# Patient Record
Sex: MALE | Race: WHITE | NOT HISPANIC OR LATINO | Employment: OTHER | URBAN - METROPOLITAN AREA
[De-identification: names, ages, dates, MRNs, and addresses within clinical notes are randomized per-mention and may not be internally consistent; named-entity substitution may affect disease eponyms.]

---

## 2023-08-24 ENCOUNTER — HOSPITAL ENCOUNTER (OUTPATIENT)
Dept: DATA CONVERSION | Facility: HOSPITAL | Age: 88
End: 2023-08-24
Attending: INTERNAL MEDICINE
Payer: MEDICARE

## 2023-08-24 DIAGNOSIS — R13.19 OTHER DYSPHAGIA: ICD-10-CM

## 2023-08-24 DIAGNOSIS — K29.80 DUODENITIS WITHOUT BLEEDING: ICD-10-CM

## 2023-08-24 DIAGNOSIS — K44.9 DIAPHRAGMATIC HERNIA WITHOUT OBSTRUCTION OR GANGRENE: ICD-10-CM

## 2023-08-24 DIAGNOSIS — K25.9 GASTRIC ULCER, UNSPECIFIED AS ACUTE OR CHRONIC, WITHOUT HEMORRHAGE OR PERFORATION: ICD-10-CM

## 2023-08-24 DIAGNOSIS — R13.10 DYSPHAGIA, UNSPECIFIED: ICD-10-CM

## 2023-08-24 DIAGNOSIS — K22.2 ESOPHAGEAL OBSTRUCTION: ICD-10-CM

## 2023-08-30 LAB
COMPLETE PATHOLOGY REPORT: NORMAL
CONVERTED CLINICAL DIAGNOSIS-HISTORY: NORMAL
CONVERTED FINAL DIAGNOSIS: NORMAL
CONVERTED FINAL REPORT PDF LINK TO COPY AND PASTE: NORMAL
CONVERTED GROSS DESCRIPTION: NORMAL

## 2023-09-08 PROBLEM — M12.812 ROTATOR CUFF ARTHROPATHY OF LEFT SHOULDER: Status: ACTIVE | Noted: 2023-09-08

## 2023-09-08 PROBLEM — I65.23 ASYMPTOMATIC BILATERAL CAROTID ARTERY STENOSIS: Status: ACTIVE | Noted: 2023-09-08

## 2023-09-08 PROBLEM — K21.9 HIATAL HERNIA WITH GERD: Status: ACTIVE | Noted: 2023-09-08

## 2023-09-08 PROBLEM — I35.0 AORTIC STENOSIS: Status: ACTIVE | Noted: 2023-09-08

## 2023-09-08 PROBLEM — I10 HYPERTENSION, ESSENTIAL: Status: ACTIVE | Noted: 2023-09-08

## 2023-09-08 PROBLEM — R00.1 BRADYCARDIA: Status: ACTIVE | Noted: 2023-09-08

## 2023-09-08 PROBLEM — R13.19 ESOPHAGEAL DYSPHAGIA: Status: ACTIVE | Noted: 2023-09-08

## 2023-09-08 PROBLEM — K22.2 ESOPHAGEAL STRICTURE: Status: ACTIVE | Noted: 2023-09-08

## 2023-09-08 PROBLEM — I73.9 PERIPHERAL VASCULAR DISEASE (CMS-HCC): Status: ACTIVE | Noted: 2023-09-08

## 2023-09-08 PROBLEM — L84 CALLUS OF FOOT: Status: ACTIVE | Noted: 2023-09-08

## 2023-09-08 PROBLEM — K44.9 HIATAL HERNIA WITH GERD: Status: ACTIVE | Noted: 2023-09-08

## 2023-09-08 PROBLEM — R01.1 CARDIAC MURMUR: Status: ACTIVE | Noted: 2023-09-08

## 2023-09-08 PROBLEM — M72.2 PLANTAR FASCIITIS: Status: ACTIVE | Noted: 2023-09-08

## 2023-09-08 RX ORDER — ENALAPRIL MALEATE 5 MG/1
5 TABLET ORAL DAILY
COMMUNITY
Start: 2023-07-13

## 2023-09-08 RX ORDER — TAMSULOSIN HYDROCHLORIDE 0.4 MG/1
0.4 CAPSULE ORAL NIGHTLY
COMMUNITY

## 2023-09-08 RX ORDER — OMEPRAZOLE 20 MG/1
20 CAPSULE, DELAYED RELEASE ORAL DAILY
COMMUNITY
Start: 2023-07-22

## 2023-09-08 RX ORDER — MULTIVITAMIN
1 TABLET ORAL DAILY
COMMUNITY

## 2023-09-08 RX ORDER — GABAPENTIN 300 MG/1
300 CAPSULE ORAL NIGHTLY
COMMUNITY
Start: 2016-06-02 | End: 2023-10-12 | Stop reason: ALTCHOICE

## 2023-09-08 RX ORDER — HYDROXYCHLOROQUINE SULFATE 200 MG/1
TABLET, FILM COATED ORAL
COMMUNITY

## 2023-09-08 RX ORDER — ASPIRIN 81 MG/1
1 TABLET ORAL DAILY
COMMUNITY
Start: 2018-02-01

## 2023-09-08 RX ORDER — ESOMEPRAZOLE MAGNESIUM 40 MG/1
40 CAPSULE, DELAYED RELEASE ORAL DAILY
COMMUNITY
End: 2023-10-12 | Stop reason: ALTCHOICE

## 2023-09-08 RX ORDER — PREDNISONE 5 MG/1
5 TABLET ORAL EVERY MORNING
COMMUNITY

## 2023-09-08 RX ORDER — ATORVASTATIN CALCIUM 10 MG/1
0.5 TABLET, FILM COATED ORAL DAILY
COMMUNITY
Start: 2018-02-01 | End: 2023-10-12 | Stop reason: SINTOL

## 2023-09-08 RX ORDER — IBUPROFEN 600 MG/1
600 TABLET ORAL EVERY 8 HOURS PRN
COMMUNITY
End: 2023-10-12 | Stop reason: ALTCHOICE

## 2023-09-08 RX ORDER — METHOTREXATE 2.5 MG/1
4 TABLET ORAL
COMMUNITY

## 2023-09-08 RX ORDER — KETOCONAZOLE 20 MG/ML
SHAMPOO, SUSPENSION TOPICAL
COMMUNITY
Start: 2023-07-27

## 2023-09-08 RX ORDER — ROSUVASTATIN CALCIUM 5 MG/1
5 TABLET, COATED ORAL 2 TIMES WEEKLY
COMMUNITY
Start: 2023-07-20

## 2023-09-29 VITALS — WEIGHT: 165.34 LBS | HEIGHT: 69 IN | BODY MASS INDEX: 24.49 KG/M2

## 2023-10-12 ENCOUNTER — OFFICE VISIT (OUTPATIENT)
Dept: CARDIOLOGY | Facility: HOSPITAL | Age: 88
End: 2023-10-12
Payer: MEDICARE

## 2023-10-12 ENCOUNTER — HOSPITAL ENCOUNTER (OUTPATIENT)
Dept: RADIOLOGY | Facility: HOSPITAL | Age: 88
Discharge: HOME | End: 2023-10-12
Payer: MEDICARE

## 2023-10-12 ENCOUNTER — HOSPITAL ENCOUNTER (OUTPATIENT)
Dept: VASCULAR MEDICINE | Facility: HOSPITAL | Age: 88
Discharge: HOME | End: 2023-10-12
Payer: MEDICARE

## 2023-10-12 VITALS
BODY MASS INDEX: 24.68 KG/M2 | DIASTOLIC BLOOD PRESSURE: 68 MMHG | HEIGHT: 69 IN | SYSTOLIC BLOOD PRESSURE: 170 MMHG | OXYGEN SATURATION: 97 % | HEART RATE: 67 BPM | WEIGHT: 166.6 LBS

## 2023-10-12 DIAGNOSIS — I10 HYPERTENSION, ESSENTIAL: Primary | ICD-10-CM

## 2023-10-12 DIAGNOSIS — I73.9 CLAUDICATION (CMS-HCC): ICD-10-CM

## 2023-10-12 DIAGNOSIS — I35.0 AORTIC VALVE STENOSIS, ETIOLOGY OF CARDIAC VALVE DISEASE UNSPECIFIED: ICD-10-CM

## 2023-10-12 PROCEDURE — 1125F AMNT PAIN NOTED PAIN PRSNT: CPT | Performed by: INTERNAL MEDICINE

## 2023-10-12 PROCEDURE — 3077F SYST BP >= 140 MM HG: CPT | Performed by: INTERNAL MEDICINE

## 2023-10-12 PROCEDURE — 3078F DIAST BP <80 MM HG: CPT | Performed by: INTERNAL MEDICINE

## 2023-10-12 PROCEDURE — 1159F MED LIST DOCD IN RCRD: CPT | Performed by: INTERNAL MEDICINE

## 2023-10-12 PROCEDURE — 1160F RVW MEDS BY RX/DR IN RCRD: CPT | Performed by: INTERNAL MEDICINE

## 2023-10-12 PROCEDURE — 99214 OFFICE O/P EST MOD 30 MIN: CPT | Performed by: INTERNAL MEDICINE

## 2023-10-12 PROCEDURE — 93922 UPR/L XTREMITY ART 2 LEVELS: CPT

## 2023-10-12 PROCEDURE — 1036F TOBACCO NON-USER: CPT | Performed by: INTERNAL MEDICINE

## 2023-10-12 PROCEDURE — 93922 UPR/L XTREMITY ART 2 LEVELS: CPT | Performed by: INTERNAL MEDICINE

## 2023-10-12 RX ORDER — DOCUSATE SODIUM 100 MG/1
100 CAPSULE, LIQUID FILLED ORAL
COMMUNITY

## 2023-10-12 ASSESSMENT — ENCOUNTER SYMPTOMS
OCCASIONAL FEELINGS OF UNSTEADINESS: 0
LOSS OF SENSATION IN FEET: 0
DEPRESSION: 0

## 2023-10-12 NOTE — PATIENT INSTRUCTIONS
Claudication right lower extremity( muscle aches right leg upon waking which resolves upon rest)- TERESITA ( vascular studies today to assess)   Carotid disease-  most recent carotid ultrasound jan 2023- asymptomatic moderate disease left internal carotid and < 50% right internal carotid artery- continue aspirin 81 mg daily and rosuvastatin 5 mg daily2 x per week  Hypertension- BP appears well controlled at home on  enalapril 5 mg daily. No change in meds. Continue current regimen. Continue to follow at home.   Continue current level of exercise/activity.   Mild aortic stenosis on most recent echo  Return to clinic in 3 months

## 2023-10-12 NOTE — PROGRESS NOTES
Date of Visit: 10/12/2023 11:00 AM EDT  Location of visit: [facility]   Type of Visit: New Patient       HPI / Summary:   Addy Souza is a very pleasant 89 y.o. male presenting for follow-up     Patient is an 89 year old man with HTN, mild AS PMR and giant cell arteritis with stable asymptomatic carotid stenosis who returns for follow-up     Prior eval:  2/1/2018 TTE normal LV size and systolic function LVEF 65% with impaired relaxation , AV sclerosis with gradients of 21/8mmHg and no AI. moderate MAC with trivial MR  Carotid ultrasound 2/1/2018: RIght carotid < 50% no significant stenosis in R external, Left carotid 50-69%, stable c/w prior , no significant left external carotid  Followup vascular studies 2/26/2020: R carotid 50-60% prox SANDOVAL, No significant stenosis of R common carotid and right vertebral patent. Left carotid 50-60% prox LICA, > 50% left external carotid no hemodynamically significant stenosis of L common carotid or left subclavian. Left vertebral patent.  Carotid studies 5/6/2022: L ICA with 50-69% stenosis, SANDOVAL < 50% stenosis. Heterogenous plaque bilaterally  Patient is on low dose statin with good response and no myalgias.    Does have PAD, with vascular studies done at [facility] with significant disease with reduced pressures in toe, but only mildly reduced ABIs. Denies any non-healing lesions on toes. Remains on aspirin without any bleeding or bruising issues. Denies cough on enalapril. BP at home running lower since back surgery was running 110-120mmHg systolic on a lower dose of enalapril ( decreased fro 40 to 10 mg daily). In February 2022 pt had repeat carotid studies at [facility] which were stable since prior studies. Feb 7 2022: SANDOVAL 40-59%, Left with 60-79% stenosis, reported to be stable compared with prior from 2016.   preop eval included cardiac stress MRI: Dec 2021: Normal LV size and systolic function with LVEF 68%. no RWMA, no LV thrombus. Mild mid-myocardial enhancement of basal  septal and lateral segments, no infiltration or infarction. Stress perfusion normal, IE no inducible ischemia. severe LAE, moderate RADHA. AV 34/24mmHg mild AI. moderate aortic stenosis.    Echo from Jan 19 2023: normal LV size and fx 60-65% no RWMA, trileaflet AV calcified with gradients of 32.3/15mmHg with DI of 0.44 consistent with mild AS.      In spring of 2022 had back surgery for spinal stenosis and later had hip replacement. Was working hard with PT but still had issues with LE weakness so atorvastatin was stopped. Was to start rosuvastatin 5 mg 2 x per week . Appears to be tolerating his rosuvastatin without myalgias at this point. He is walking about 1 mile daily and does some exercises at home for mobility. He has no CP or SOB with his ADLs. Does find the 1 mile walk difficult. Does note that he gets some right LE muscle aches when walking which resolves when he stops ( possible claudication) . Denies any dizziness presyncope or syncope. BP at home runs 110s-120/60smmHg with HR 60s bpm.        ROS: Full 10 pt review of symptoms of negative unless discussed above.     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    Personal history of malignant melanoma of skin 02/01/2018    History of malignant melanoma of skin    Personal history of other diseases of the musculoskeletal system and connective tissue 04/07/2014    Personal history of arthritis    Personal history of other diseases of the musculoskeletal  "system and connective tissue 02/01/2018    History of giant cell arteritis    Personal history of other diseases of the musculoskeletal system and connective tissue 02/01/2018    History of polymyalgia rheumatica     Surgical Hx:   Past Surgical History:   Procedure Laterality Date    TOTAL HIP ARTHROPLASTY  02/01/2018    Hip Replacement      Social Hx:   Tobacco Use: Medium Risk (10/12/2023)    Patient History     Smoking Tobacco Use: Former     Smokeless Tobacco Use: Never     Passive Exposure: Not on file     Alcohol Use: Not on file     Family Hx:   Family History   Problem Relation Name Age of Onset    No Known Problems Mother      No Known Problems Father        Exam:   Vitals:   Vitals:    10/12/23 1100   BP: 170/68   BP Location: Left arm   Patient Position: Sitting   BP Cuff Size: Adult   Pulse: 67   SpO2: 97%   Weight: 75.6 kg (166 lb 9.6 oz)   Height: 1.753 m (5' 9\")     Wt Readings from Last 5 Encounters:   10/12/23 75.6 kg (166 lb 9.6 oz)   07/20/23 77.1 kg (170 lb 0.4 oz)   01/19/23 74 kg (163 lb 0.6 oz)   09/08/22 74.4 kg (164 lb 0.2 oz)   06/14/22 72.1 kg (159 lb)      Constitutional:       Appearance: Healthy appearance. Not in distress.   Pulmonary:      Effort: Pulmonary effort is normal.      Breath sounds: Normal breath sounds.   Cardiovascular:      PMI at left midclavicular line. Normal rate. Regular rhythm. Normal S1. Normal S2.       Murmurs: There is a grade 2/6 crescendo systolic murmur, radiating to the LRSB.      Comments: Reduced pulses DP and PT RLE.   Edema:     Peripheral edema absent.   Neurological:      Mental Status: Alert and oriented to person, place, and time.       Labs:   Recent Labs     05/03/22  1057 05/12/22  0648 05/13/22  0649 05/13/22 2024 05/14/22  0801 05/15/22  0500 05/19/22  0756   WBC 9.8 12.1* 11.0 10.8 8.8 8.4 7.5   HGB 9.2* 7.5* 7.1* 7.7* 8.0* 8.4* 8.1*   HCT 29.8* 24.5* 22.8* 23.8* 24.3* 27.4* 26.3*    250 213 220 216 280 354   MCV 95 97 95 90 90 94 94 "     Recent Labs     20  0733 10/29/21  0905 22  1057 22  0648 22  0649 22  0801 05/15/22  0500 22  0756    140 137 138 137 136 141 139   K 4.4 4.5 4.4 4.3 4.1 4.0 4.3 4.1    106 103 104 106 105 107 106   BUN 23 34* 34* 21 20 22 23 26*   CREATININE 1.25 1.45* 1.40* 1.23 1.03 1.02 1.05 1.14      Recent Labs     21  0753   HGBA1C 5.5     RESUFAST(CHOL:1,HDL:1,LDLF:1,TRI)  Lab Results   Component Value Date    CHOL 128 2022    HDL 70.1 2022    LDLF 45 2022    TRIG 64 2022     Notable Studies: imaging personally reviewed and summarized by me below  EKG:  EKG from 2023: sinus bradycardia at 55 bpm, otherwise normal EKG.     Echo:   CONCLUSIONS: 2023   1. Left ventricular systolic function is normal with a 60-65% estimated ejection fraction.   2. Spectral Doppler shows an impaired relaxation pattern of left ventricular diastolic filling.   3. There is moderate mitral annular calcification.   4. Slightly elevated RVSP.   5. Moderately calcified and restricted AV with gradients of 32.3/15mmHg and DI of 0.44, consistent with mild AS. There is no AI.   6. There is moderate aortic valve cusp calcification.   7. Compared with the prior exam from 2018 the AV gradients have increased from 21/8mmHg to 32/15mmHg. DI has decreased from 0.57 to 0.44. Overall progression from aortic sclerosis to mild stenosis.     Carotid studies 2022:  CONCLUSIONS:  Right Carotid: Findings are consistent with less than 50% stenosis of the right proximal ICA. Right external carotid artery appears patent with no evidence of stenosis. No evidence of hemodynamically significant stenosis of the right common carotid artery. The right vertebral artery is patent with antegrade flow. Vertebral artery velocities with head turned to right: 27cm/s  Vertebral artery velocities with head turned to left: 65cm/s. No evidence of hemodynamically significant stenosis in  the right subclavian.  Left Carotid: Findings are consistent with 50 to 69% stenosis of the left proximal ICA. There are elevated velocities in the left ECA that are suggestive of disease. No evidence of hemodynamically significant stenosis of the left common carotid artery. The left vertebral artery is patent with antegrade flow. Vertebral artery velocities with head turned to right: 80cm/s  Vertebral artery velocities with head turned to left: 92cm/s. No evidence of hemodynamically significant stenosis in the left subclavian.  Additional Findings:  Category of stenoses unchanged. Unable to obtain velocities of 231cm/s of left  internal carotid artery noted on previous exam 2/26/2020.     Imaging & Doppler Findings:  Right Plaque Morph: The distal right common carotid artery demonstrates heterogenous plaque. The right carotid bulb demonstrates heterogenous plaque.  Left Plaque Morph: The proximal left internal carotid artery demonstrates heterogenous and calcified plaque. The distal left common carotid artery demonstrates heterogenous and calcified plaque. The left carotid bulb demonstrates heterogenous and calcified plaque.     Current Outpatient Medications   Medication Instructions    aspirin 81 mg EC tablet 1 tablet, oral, Daily    docusate sodium (COLACE) 100 mg, oral, Daily with breakfast    enalapril (VASOTEC) 5 mg, oral, Daily, as directed    hydroxychloroquine (Plaquenil) 200 mg tablet TAKE 1 TABLET (200MG) ON MON,WED AND FRI AND 2 TABLETS (400MG) ON OTHER 4 DAYS    ketoconazole (NIZOral) 2 % shampoo PLEASE SEE ATTACHED FOR DETAILED DIRECTIONS    methotrexate (Trexall) 2.5 mg tablet 4 tablets, oral, Weekly    multivitamin tablet 1 tablet, oral, Daily    omeprazole (PRILOSEC) 20 mg, oral, Daily    predniSONE (DELTASONE) 5 mg, oral, Every morning    rosuvastatin (CRESTOR) 5 mg, oral, 2 times weekly    tamsulosin (FLOMAX) 0.4 mg, oral, Nightly     Impressions and Plan:    Pt is a mary anne 89 year old man with  stable asymptomatic moderate carotid disease, hypertension, PMR with giant cell arteritis  and hyperlipidemia whose BP has been well controlled at home and he is able to walk a mild daily without CP or SOB though somewhat difficult given issues with his back and hip. Also had possible claudication so will get TERESITA to exclude significant vascular disease .  He is tolerating his low dose rosuvastatin 2 x per week without myalgias.   Plan  Carotid disease-asx- continue aspirin 81 mg daily and rosuvastatin 5 mg twice weekly  HTN- continue current regimen including enalapril 5 mg daily   Possible claudication- ABIs  Continue current level of exercise. Pt considering to resume working with  to work on strength  Return to clinic in 3 months     Patient Instructions:  If you have any questions or need cardiac medication refills, please call my office at [phone],      To reach my office please call [phone]  To schedule an appointment call [phone].          ____________________________________________________________  Joanne Garay MD  Division of Cardiovascular Medicine  [facility]  [facility]

## 2023-10-13 ENCOUNTER — LAB (OUTPATIENT)
Dept: LAB | Facility: LAB | Age: 88
End: 2023-10-13
Payer: MEDICARE

## 2023-10-13 DIAGNOSIS — I65.23 OCCLUSION AND STENOSIS OF BILATERAL CAROTID ARTERIES: Primary | ICD-10-CM

## 2023-10-13 LAB
CHOLEST SERPL-MCNC: 138 MG/DL (ref 0–199)
CHOLESTEROL/HDL RATIO: 2
HDLC SERPL-MCNC: 68.7 MG/DL
LDLC SERPL CALC-MCNC: 55 MG/DL
NON HDL CHOLESTEROL: 69 MG/DL (ref 0–149)
TRIGL SERPL-MCNC: 74 MG/DL (ref 0–149)
VLDL: 15 MG/DL (ref 0–40)

## 2023-10-13 PROCEDURE — 36415 COLL VENOUS BLD VENIPUNCTURE: CPT

## 2023-10-13 PROCEDURE — 80061 LIPID PANEL: CPT

## 2023-10-20 ENCOUNTER — TELEPHONE (OUTPATIENT)
Dept: CARDIOLOGY | Facility: HOSPITAL | Age: 88
End: 2023-10-20
Payer: MEDICARE

## 2023-10-20 NOTE — TELEPHONE ENCOUNTER
Spoke to pt regarding lipid panel results and TERESITA. No need to change statin dose since currently at LDL target. TERESITA show some disease but no severe stenoses that would require intervention. Will continue current medical manageResult Communication    Resulted Orders   Lipid Panel   Result Value Ref Range    Cholesterol 138 0 - 199 mg/dL      Comment:            Age      Desirable   Borderline High   High     0-19 Y     0 - 169       170 - 199     >/= 200    20-24 Y     0 - 189       190 - 224     >/= 225         >24 Y     0 - 199       200 - 239     >/= 240   **All ranges are based on fasting samples. Specific   therapeutic targets will vary based on patient-specific   cardiac risk.    Pediatric guidelines reference:Pediatrics 2011, 128(S5).Adult guidelines reference: NCEP ATPIII Guidelines,ANTONELLA 2001, 258:2486-97    Venipuncture immediately after or during the administration of Metamizole may lead to falsely low results. Testing should be performed immediately prior to Metamizole dosing.    HDL-Cholesterol 68.7 mg/dL      Comment:        Age       Very Low   Low     Normal    High    0-19 Y    < 35      < 40     40-45     ----  20-24 Y    ----     < 40      >45      ----        >24 Y      ----     < 40     40-60      >60      Cholesterol/HDL Ratio 2.0       Comment:        Ref Values  Desirable  < 3.4  High Risk  > 5.0    LDL Calculated 55 <=99 mg/dL      Comment:                                  Near   Borderline      AGE      Desirable  Optimal    High     High     Very High     0-19 Y     0 - 109     ---    110-129   >/= 130     ----    20-24 Y     0 - 119     ---    120-159   >/= 160     ----      >24 Y     0 -  99   100-129  130-159   160-189     >/=190      VLDL 15 0 - 40 mg/dL    Triglycerides 74 0 - 149 mg/dL      Comment:         Age         Desirable   Borderline High   High     Very High   0 D-90 D    19 - 174         ----         ----        ----  91 D- 9 Y     0 -  74        75 -  99     >/= 100       ----    10-19 Y     0 -  89        90 - 129     >/= 130      ----    20-24 Y     0 - 114       115 - 149     >/= 150      ----         >24 Y     0 - 149       150 - 199    200- 499    >/= 500    Venipuncture immediately after or during the administration of Metamizole may lead to falsely low results. Testing should be performed immediately prior to Metamizole dosing.    Non HDL Cholesterol 69 0 - 149 mg/dL      Comment:            Age       Desirable   Borderline High   High     Very High     0-19 Y     0 - 119       120 - 144     >/= 145    >/= 160    20-24 Y     0 - 149       150 - 189     >/= 190      ----         >24 Y    30 mg/dL above LDL Cholesterol goal         3:35 PM      Results were successfully communicated with the patient and they acknowledged their understanding.  ment

## 2023-10-24 ENCOUNTER — OFFICE VISIT (OUTPATIENT)
Dept: GASTROENTEROLOGY | Facility: CLINIC | Age: 88
End: 2023-10-24
Payer: MEDICARE

## 2023-10-24 VITALS
BODY MASS INDEX: 24.43 KG/M2 | WEIGHT: 165.4 LBS | HEART RATE: 65 BPM | SYSTOLIC BLOOD PRESSURE: 133 MMHG | DIASTOLIC BLOOD PRESSURE: 71 MMHG | TEMPERATURE: 97.3 F

## 2023-10-24 DIAGNOSIS — K22.2 ESOPHAGEAL STRICTURE: ICD-10-CM

## 2023-10-24 DIAGNOSIS — K44.9 HIATAL HERNIA WITH GERD: ICD-10-CM

## 2023-10-24 DIAGNOSIS — K21.9 HIATAL HERNIA WITH GERD: ICD-10-CM

## 2023-10-24 DIAGNOSIS — R13.19 ESOPHAGEAL DYSPHAGIA: Primary | ICD-10-CM

## 2023-10-24 PROCEDURE — 99212 OFFICE O/P EST SF 10 MIN: CPT | Performed by: INTERNAL MEDICINE

## 2023-10-24 PROCEDURE — 1036F TOBACCO NON-USER: CPT | Performed by: INTERNAL MEDICINE

## 2023-10-24 PROCEDURE — 1159F MED LIST DOCD IN RCRD: CPT | Performed by: INTERNAL MEDICINE

## 2023-10-24 PROCEDURE — 99202 OFFICE O/P NEW SF 15 MIN: CPT | Performed by: INTERNAL MEDICINE

## 2023-10-24 PROCEDURE — 1125F AMNT PAIN NOTED PAIN PRSNT: CPT | Performed by: INTERNAL MEDICINE

## 2023-10-24 PROCEDURE — 3078F DIAST BP <80 MM HG: CPT | Performed by: INTERNAL MEDICINE

## 2023-10-24 PROCEDURE — 3075F SYST BP GE 130 - 139MM HG: CPT | Performed by: INTERNAL MEDICINE

## 2023-10-24 PROCEDURE — 1160F RVW MEDS BY RX/DR IN RCRD: CPT | Performed by: INTERNAL MEDICINE

## 2023-10-24 ASSESSMENT — ENCOUNTER SYMPTOMS
GASTROINTESTINAL NEGATIVE: 1
CONSTITUTIONAL NEGATIVE: 1

## 2023-10-24 NOTE — PROGRESS NOTES
Subjective   Patient ID: Addy Souza is a 89 y.o. male who presents for Follow-up (From procedure).  Here after EGD with dilation  Careful on [place] trip  Careful to chew food well  No dysphagia   No problems tolerating Prilosec once a day        Review of Systems   Constitutional: Negative.    Gastrointestinal: Negative.        Objective   Physical Exam  Vitals reviewed.   Constitutional:       Appearance: Normal appearance.   Neurological:      Mental Status: He is alert.         Assessment/Plan   Diagnoses and all orders for this visit:  Esophageal dysphagia resolved  Hiatal hernia with GERD  Esophageal stricture better off on low dose PPI to reduce risk of needing repeat dilation

## 2023-10-24 NOTE — PATIENT INSTRUCTIONS
I'm pleased at your outcome from the endoscopy with dilation  Prilosec 20 mg per day before breakfast will reduce the chance of needing to repeat the procedure in the future

## 2024-01-04 ENCOUNTER — OFFICE VISIT (OUTPATIENT)
Dept: CARDIOLOGY | Facility: HOSPITAL | Age: 89
End: 2024-01-04
Payer: MEDICARE

## 2024-01-04 VITALS
WEIGHT: 166.5 LBS | BODY MASS INDEX: 24.66 KG/M2 | SYSTOLIC BLOOD PRESSURE: 173 MMHG | OXYGEN SATURATION: 100 % | DIASTOLIC BLOOD PRESSURE: 76 MMHG | HEIGHT: 69 IN | HEART RATE: 64 BPM

## 2024-01-04 DIAGNOSIS — R01.1 CARDIAC MURMUR: ICD-10-CM

## 2024-01-04 DIAGNOSIS — I73.9 PERIPHERAL VASCULAR DISEASE (CMS-HCC): ICD-10-CM

## 2024-01-04 DIAGNOSIS — I10 HYPERTENSION, ESSENTIAL: Primary | ICD-10-CM

## 2024-01-04 DIAGNOSIS — I35.0 NONRHEUMATIC AORTIC VALVE STENOSIS: ICD-10-CM

## 2024-01-04 PROCEDURE — 99214 OFFICE O/P EST MOD 30 MIN: CPT | Mod: 25 | Performed by: INTERNAL MEDICINE

## 2024-01-04 PROCEDURE — 93005 ELECTROCARDIOGRAM TRACING: CPT | Performed by: INTERNAL MEDICINE

## 2024-01-04 PROCEDURE — 1159F MED LIST DOCD IN RCRD: CPT | Performed by: INTERNAL MEDICINE

## 2024-01-04 PROCEDURE — 3078F DIAST BP <80 MM HG: CPT | Performed by: INTERNAL MEDICINE

## 2024-01-04 PROCEDURE — 1036F TOBACCO NON-USER: CPT | Performed by: INTERNAL MEDICINE

## 2024-01-04 PROCEDURE — 1160F RVW MEDS BY RX/DR IN RCRD: CPT | Performed by: INTERNAL MEDICINE

## 2024-01-04 PROCEDURE — 1125F AMNT PAIN NOTED PAIN PRSNT: CPT | Performed by: INTERNAL MEDICINE

## 2024-01-04 PROCEDURE — 93010 ELECTROCARDIOGRAM REPORT: CPT | Performed by: INTERNAL MEDICINE

## 2024-01-04 PROCEDURE — 3077F SYST BP >= 140 MM HG: CPT | Performed by: INTERNAL MEDICINE

## 2024-01-04 PROCEDURE — 99214 OFFICE O/P EST MOD 30 MIN: CPT | Performed by: INTERNAL MEDICINE

## 2024-01-04 ASSESSMENT — ENCOUNTER SYMPTOMS
DEPRESSION: 0
OCCASIONAL FEELINGS OF UNSTEADINESS: 0
LOSS OF SENSATION IN FEET: 0

## 2024-01-04 NOTE — PROGRESS NOTES
Primary Care Physician: No primary care provider on file.      Date of Visit: 01/04/2024 10:40 AM EST  Location of visit: [facility]   Type of Visit: Established Patient     HPI / Summary:   Addy Souza is a very pleasant 89 y.o. male  with HTN, mild aortic stenosis, giant cell arteritis with stable asymptomatic moderate carotid stenosis who returns for follow up     Prior eval:  2/1/2018 TTE normal LV size and systolic function LVEF 65% with impaired relaxation , AV sclerosis with gradients of 21/8mmHg and no AI. moderate MAC with trivial MR  Carotid ultrasound 2/1/2018: RIght carotid < 50% no significant stenosis in R external, Left carotid 50-69%, stable c/w prior , no significant left external carotid  Followup vascular studies 2/26/2020: R carotid 50-60% prox SANDOVAL, No significant stenosis of R common carotid and right vertebral patent. Left carotid 50-60% prox LICA, > 50% left external carotid no hemodynamically significant stenosis of L common carotid or left subclavian. Left vertebral patent.  Carotid studies 5/6/2022: L ICA with 50-69% stenosis, SANDOVAL < 50% stenosis. Heterogenous plaque bilaterally     Does have PAD, with vascular studies done at [facility] with significant disease with reduced pressures in toe, but only mildly reduced ABIs. Denies any non-healing lesions on toes. Remains on aspirin without any bleeding or bruising issues. Denies cough on enalapril. In February 2022 pt had repeat carotid studies at [facility] which were stable since prior studies. Feb 7 2022: SANDOVAL 40-59%, Left with 60-79% stenosis, reported to be stable compared with prior from 2016.   preop eval included cardiac stress MRI: Dec 2021: Normal LV size and systolic function with LVEF 68%. no RWMA, no LV thrombus. Mild mid-myocardial enhancement of basal septal and lateral segments, no infiltration or infarction. Stress perfusion normal, IE no inducible ischemia. severe LAE, moderate RADHA. AV 34/24mmHg mild AI. moderate aortic  stenosis.     Echo from Jan 19 2023: normal LV size and fx 60-65% no RWMA, trileaflet AV calcified with gradients of 32.3/15mmHg with DI of 0.44 consistent with mild AS.      In spring of 2022 had back surgery for spinal stenosis and later had hip replacement. Was working hard with PT but still had issues with LE weakness so atorvastatin was stopped. Now on rosuvastatin 5 mg 2 x per week  without myalgias. He is walking about 1 mile daily ( 30 minutes) and does some exercises at home for mobility. He has no CP or SOB with his ADLs.  Does note that he gets some right LE muscle aches when walking which resolves when he stops. Re? Of  possible claudication, recent ABIs did not show significant stenoses. Denies any dizziness presyncope or syncope. BP at home runs 110s/60smmHg with HR 60s bpm. His right foot does get colder faster than left and had reduced toe perfusion on that side. Does not have any nonhealing ulcers.           ROS: Full 10 pt review of symptoms of negative unless discussed above.     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    Personal history of malignant melanoma of skin 02/01/2018    History of malignant melanoma of skin    Personal history of other diseases of the musculoskeletal system and connective tissue 04/07/2014    Personal history of arthritis    Personal history of other diseases of the musculoskeletal system and connective tissue 02/01/2018    History of giant cell arteritis    Personal history of other  "diseases of the musculoskeletal system and connective tissue 02/01/2018    History of polymyalgia rheumatica     Surgical Hx:   Past Surgical History:   Procedure Laterality Date    TOTAL HIP ARTHROPLASTY  02/01/2018    Hip Replacement      Social Hx:   Tobacco Use: Medium Risk (1/4/2024)    Patient History     Smoking Tobacco Use: Former     Smokeless Tobacco Use: Never     Passive Exposure: Not on file     Alcohol Use: Not on file     Family Hx:   Family History   Problem Relation Name Age of Onset    No Known Problems Mother      No Known Problems Father        Exam:   Vitals:   Vitals:    01/04/24 1050   BP: 173/76   BP Location: Left arm   Patient Position: Sitting   BP Cuff Size: Adult   Pulse: 64   SpO2: 100%   Weight: 75.5 kg (166 lb 8 oz)   Height: 1.753 m (5' 9\")     Wt Readings from Last 5 Encounters:   01/04/24 75.5 kg (166 lb 8 oz)   10/24/23 75 kg (165 lb 6.4 oz)   10/12/23 75.6 kg (166 lb 9.6 oz)   07/20/23 77.1 kg (170 lb 0.4 oz)   01/19/23 74 kg (163 lb 0.6 oz)      Constitutional:       Appearance: Healthy appearance. Not in distress.   Pulmonary:      Effort: Pulmonary effort is normal.      Breath sounds: Normal breath sounds.   Cardiovascular:      PMI at left midclavicular line. Normal rate. Regular rhythm. S1 with normal intensity. S2 with normal intensity.       Murmurs: There is a grade 2/6 crescendo-decrescendo systolic murmur.      No gallop.    Edema:     Peripheral edema absent.   Neurological:      Mental Status: Alert and oriented to person, place, and time.       Labs:   Recent Labs     05/19/22  0756 05/15/22  0500 05/14/22  0801 05/13/22 2024 05/13/22  0649 05/12/22  0648 05/03/22  1057   WBC 7.5 8.4 8.8 10.8 11.0 12.1* 9.8   HGB 8.1* 8.4* 8.0* 7.7* 7.1* 7.5* 9.2*   HCT 26.3* 27.4* 24.3* 23.8* 22.8* 24.5* 29.8*    280 216 220 213 250 266   MCV 94 94 90 90 95 97 95     Recent Labs     05/19/22  0756 05/15/22  0500 05/14/22  0801 05/13/22  0649 05/12/22  0648 05/03/22  1057 " 10/29/21  0905 03/12/20  0733    141 136 137 138 137 140 140   K 4.1 4.3 4.0 4.1 4.3 4.4 4.5 4.4    107 105 106 104 103 106 104   BUN 26* 23 22 20 21 34* 34* 23   CREATININE 1.14 1.05 1.02 1.03 1.23 1.40* 1.45* 1.25      Recent Labs     04/23/21  0753   HGBA1C 5.5     Lab Results   Component Value Date    CHOL 138 10/13/2023    HDL 68.7 10/13/2023    LDLF 45 09/12/2022    TRIG 74 10/13/2023   LDL on 10/13/2023: 55.  Notable Studies: imaging personally reviewed and summarized by me below  EKG:  Encounter Date: 01/04/24   ECG 12 lead (Clinic Performed)   Result Value    Ventricular Rate 64    Atrial Rate 64    VA Interval 148    QRS Duration 84    QT Interval 396    QTC Calculation(Bazett) 408    P Axis 68    R Axis 42    T Axis 43    QRS Count 10    Q Onset 222    P Onset 148    P Offset 199    T Offset 420    QTC Fredericia 404    Narrative    Normal sinus rhythm  Normal ECG  When compared with ECG of 20-JUL-2023 11:29,  No significant change was found       Echo:  - Echo (1/19/2023)  PHYSICIAN INTERPRETATION:  Left Ventricle: The left ventricular systolic function is normal, with an estimated ejection fraction of 60-65%. There are no regional wall motion abnormalities. The left ventricular cavity size is normal. Spectral Doppler shows an impaired relaxation pattern of left ventricular diastolic filling.  Left Atrium: The left atrium is mildly dilated. Mobile atrial septum.  Right Ventricle: The right ventricle is normal in size. There is normal right ventricular global systolic function.  Right Atrium: The right atrium is normal in size.  Aortic Valve: The aortic valve is trileaflet. There is moderate aortic valve cusp calcification. There is no evidence of aortic valve regurgitation. The peak instantaneous gradient of the aortic valve is 32.3 mmHg. The mean gradient of the aortic valve is 15.0 mmHg. Moderately calcified and restricted AV with gradients of 32.3/15mmHg and DI of 0.44, consistent with  mild AS. There is no AI.  Mitral Valve: The mitral valve is normal in structure. There is moderate mitral annular calcification. There is trace mitral valve regurgitation.  Tricuspid Valve: The tricuspid valve is structurally normal. There is trace to mild tricuspid regurgitation. The Doppler estimated RVSP is slightly elevated at 32.8 mmHg.  Pulmonic Valve: The pulmonic valve is not well visualized. The pulmonic valve regurgitation was not well visualized.  Pericardium: There is a trivial pericardial effusion.  Aorta: The aortic root is normal. There is mild dilatation of the ascending aorta.  Systemic Veins: The inferior vena cava appears to be of normal size. There is IVC inspiratory collapse greater than 50%.  In comparison to the previous echocardiogram(s): Compared with study from 5/6/2022,. Compared with the prior exam from 2/1/2018 the AV gradients have increased from 21/8mmHg to 32/15mmHg. DI has decreased from 0.57 to 0.44. Overall progression from aortic sclerosis to mild stenosis.     CONCLUSIONS:   1. Left ventricular systolic function is normal with a 60-65% estimated ejection fraction.   2. Spectral Doppler shows an impaired relaxation pattern of left ventricular diastolic filling.   3. There is moderate mitral annular calcification.   4. Slightly elevated RVSP.   5. Moderately calcified and restricted AV with gradients of 32.3/15mmHg and DI of 0.44, consistent with mild AS. There is no AI.   6. There is moderate aortic valve cusp calcification.   7. Compared with the prior exam from 2/1/2018 the AV gradients have increased from 21/8mmHg to 32/15mmHg. DI has decreased from 0.57 to 0.44. Overall progression from aortic sclerosis to mild stenosis.      LE TERESITA 10/12/2023:  CONCLUSIONS:     Right Lower PVR: Evidence of mild arterial occlusive disease in the right lower extremity at rest. Decreased digital perfusion noted. Monophasic flow is noted in the right dorsalis pedis artery. Biphasic flow is noted  in the right posterior tibial artery. Triphasic flow is noted in the right common femoral artery. Severity of disease called by tracings due to partially calcified non-compressible vessels. TERESITA only per Joanne Garay     Left Lower PVR: No evidence of arterial occlusive disease in the left lower extremity at rest. Left pressures of >220 mmHg suggest no compressibility of vessels and may make absolute Segmental Limb Pressures (SLP) unreliable. Normal digital perfusion noted. Biphasic flow is noted in the left dorsalis pedis artery. Triphasic flow is noted in the left common femoral artery and left posterior tibial artery.    Current Outpatient Medications   Medication Instructions    aspirin 81 mg EC tablet 1 tablet, oral, Daily    docusate sodium (COLACE) 100 mg, oral, Daily with breakfast    enalapril (VASOTEC) 5 mg, oral, Daily, as directed    hydroxychloroquine (Plaquenil) 200 mg tablet TAKE 1 TABLET (200MG) ON MON,WED AND FRI AND 2 TABLETS (400MG) ON OTHER 4 DAYS    ketoconazole (NIZOral) 2 % shampoo PLEASE SEE ATTACHED FOR DETAILED DIRECTIONS    methotrexate (Trexall) 2.5 mg tablet 4 tablets, oral, Weekly    multivitamin tablet 1 tablet, oral, Daily    omeprazole (PRILOSEC) 20 mg, oral, Daily    predniSONE (DELTASONE) 5 mg, oral, Every morning    rosuvastatin (CRESTOR) 5 mg, oral, 2 times weekly    tamsulosin (FLOMAX) 0.4 mg, oral, Nightly     Impressions and Plan:    Pt is a mary anne 89 year old man with stable asymptomatic moderate carotid disease, hypertension, PMR with giant cell arteritis  and hyperlipidemia whose BP has been well controlled at home and he is able to walk a mild daily without CP or SOB though somewhat difficult given issues with his back and hip. Also had possible claudication though ABIs  with only mild disease on the right.   Leg pain possibly due to his back issues.  He is tolerating his low dose rosuvastatin 2 x per week without myalgias.   Plan  Carotid disease-asx- continue aspirin  81 mg daily and rosuvastatin 5 mg twice weekly  HTN- continue current regimen including enalapril 5 mg daily . To bring in monitor from home to assess accuracy of reads given good control at home though elevated BP in office.   Reduced toe perfusion on the right. Continue to watch for any sores on feet.   Continue current level of exercise. Pt to continue working with PT 2 x per week.   Return to clinic in 3 months    6. Mild aortic stenosis. Will recheck echo on January 2025 unless new onset symptoms sooner.   Return to clinic in 3 months.    Patient Instructions:  If you have any questions or need cardiac medication refills, please call my office at [phone],      To reach my office please call [phone]  To schedule an appointment call [phone].          ____________________________________________________________  Joanne Garay MD  Division of Cardiovascular Medicine  [facility]  [facility]

## 2024-01-04 NOTE — PATIENT INSTRUCTIONS
Pain in  right lower extremity( muscle aches right leg upon waking which resolves upon rest)- Recent TERESITA significant stenoses other than reduced digital perfusion. May be from back issues  Carotid disease-  most recent carotid ultrasound jan 2023- asymptomatic moderate disease left internal carotid and < 50% right internal carotid artery- continue aspirin 81 mg daily and rosuvastatin 5 mg daily2 x per week  Hypertension- BP appears well controlled at home on  enalapril 5 mg daily, though high I the office today. Please bring in monitor to assess accuracy against office monitor.. No change in meds. Continue current regimen. Continue to follow at home.   Continue current level of exercise/activity.   Mild aortic stenosis on most recent echo- will recheck echo January 2025 unless new onset sx ( as discussed)  Return to clinic in 3 months

## 2024-02-18 LAB
ATRIAL RATE: 64 BPM
P AXIS: 68 DEGREES
P OFFSET: 199 MS
P ONSET: 148 MS
PR INTERVAL: 148 MS
Q ONSET: 222 MS
QRS COUNT: 10 BEATS
QRS DURATION: 84 MS
QT INTERVAL: 396 MS
QTC CALCULATION(BAZETT): 408 MS
QTC FREDERICIA: 404 MS
R AXIS: 42 DEGREES
T AXIS: 43 DEGREES
T OFFSET: 420 MS
VENTRICULAR RATE: 64 BPM

## 2024-03-18 ENCOUNTER — OFFICE VISIT (OUTPATIENT)
Dept: CARDIOLOGY | Facility: HOSPITAL | Age: 89
End: 2024-03-18
Payer: MEDICARE

## 2024-03-18 VITALS
OXYGEN SATURATION: 99 % | HEIGHT: 68 IN | WEIGHT: 163 LBS | SYSTOLIC BLOOD PRESSURE: 115 MMHG | HEART RATE: 68 BPM | DIASTOLIC BLOOD PRESSURE: 69 MMHG | BODY MASS INDEX: 24.71 KG/M2

## 2024-03-18 DIAGNOSIS — I35.0 NONRHEUMATIC AORTIC VALVE STENOSIS: Primary | ICD-10-CM

## 2024-03-18 DIAGNOSIS — I10 HYPERTENSION, ESSENTIAL: ICD-10-CM

## 2024-03-18 DIAGNOSIS — R01.1 CARDIAC MURMUR: ICD-10-CM

## 2024-03-18 DIAGNOSIS — I65.23 ASYMPTOMATIC BILATERAL CAROTID ARTERY STENOSIS: ICD-10-CM

## 2024-03-18 PROCEDURE — 1159F MED LIST DOCD IN RCRD: CPT | Performed by: INTERNAL MEDICINE

## 2024-03-18 PROCEDURE — 99214 OFFICE O/P EST MOD 30 MIN: CPT | Performed by: INTERNAL MEDICINE

## 2024-03-18 PROCEDURE — 1160F RVW MEDS BY RX/DR IN RCRD: CPT | Performed by: INTERNAL MEDICINE

## 2024-03-18 PROCEDURE — 3074F SYST BP LT 130 MM HG: CPT | Performed by: INTERNAL MEDICINE

## 2024-03-18 PROCEDURE — 1036F TOBACCO NON-USER: CPT | Performed by: INTERNAL MEDICINE

## 2024-03-18 PROCEDURE — 3078F DIAST BP <80 MM HG: CPT | Performed by: INTERNAL MEDICINE

## 2024-03-18 NOTE — PROGRESS NOTES
Primary Care Physician: No primary care provider on file.      Date of Visit: 03/18/2024 11:40 AM EDT  Location of visit: [facility]   Type of Visit: Established Patient     HPI / Summary:   Addy Souza is a very pleasant 89 y.o. male  with HTN, mild aortic stenosis, giant cell arteritis with stable asymptomatic moderate carotid stenosis who returns for follow up     Prior eval:  2/1/2018 TTE normal LV size and systolic function LVEF 65% with impaired relaxation , AV sclerosis with gradients of 21/8mmHg and no AI. moderate MAC with trivial MR  Carotid ultrasound 2/1/2018: RIght carotid < 50% no significant stenosis in R external, Left carotid 50-69%, stable c/w prior , no significant left external carotid  Followup vascular studies 2/26/2020: R carotid 50-60% prox SANDOVAL, No significant stenosis of R common carotid and right vertebral patent. Left carotid 50-60% prox LICA, > 50% left external carotid no hemodynamically significant stenosis of L common carotid or left subclavian. Left vertebral patent.  Carotid studies 5/6/2022: L ICA with 50-69% stenosis, SANDOVAL < 50% stenosis. Heterogenous plaque bilaterally     Does have PAD, with vascular studies done at [facility] with significant disease with reduced pressures in toe, but only mildly reduced ABIs. Denies any non-healing lesions on toes. Remains on aspirin without any bleeding or bruising issues. Denies cough on enalapril. In February 2022 pt had repeat carotid studies at [facility] which were stable since prior studies. Feb 7 2022: SANDOVAL 40-59%, Left with 60-79% stenosis, reported to be stable compared with prior from 2016.   preop eval included cardiac stress MRI: Dec 2021: Normal LV size and systolic function with LVEF 68%. no RWMA, no LV thrombus. Mild mid-myocardial enhancement of basal septal and lateral segments, no infiltration or infarction. Stress perfusion normal, IE no inducible ischemia. severe LAE, moderate RADHA. AV 34/24mmHg mild AI. moderate aortic  stenosis.     Echo from Jan 19 2023: normal LV size and fx 60-65% no RWMA, trileaflet AV calcified with gradients of 32.3/15mmHg with DI of 0.44 consistent with mild AS.      In spring of 2022 had back surgery for spinal stenosis and later had hip replacement. Was working hard with PT but still had issues with LE weakness so atorvastatin was stopped. Now on rosuvastatin 5 mg 2 x per week  without myalgias. He is walking about 1 mile daily ( 30 minutes) and does some exercises at home for mobility and works wit hpersonal  2 x per week. . He has no CP or SOB with his ADLs.  Does note that he gets some right LE muscle aches when walking which resolves when he stops. Re? Of  possible claudication, recent ABIs did not show significant stenoses. Denies any dizziness presyncope or syncope. BP at home runs 110s/60smmHg with HR 60s bpm. Does not have any nonhealing ulcers on his feet or toes. Has no cough on ACEi.           ROS: Full 10 pt review of symptoms of negative unless discussed above.     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    Personal history of malignant melanoma of skin 02/01/2018    History of malignant melanoma of skin    Personal history of other diseases of the musculoskeletal system and connective tissue 04/07/2014    Personal history of arthritis    Personal history of other diseases of the musculoskeletal system and connective tissue 02/01/2018    History of giant cell arteritis    Personal history of other  diseases of the musculoskeletal system and connective tissue 02/01/2018    History of polymyalgia rheumatica     Surgical Hx:   Past Surgical History:   Procedure Laterality Date    TOTAL HIP ARTHROPLASTY  02/01/2018    Hip Replacement      Social Hx:   Tobacco Use: Medium Risk (1/4/2024)    Patient History     Smoking Tobacco Use: Former     Smokeless Tobacco Use: Never     Passive Exposure: Not on file     Alcohol Use: Not on file     Family Hx:   Family History   Problem Relation Name Age of Onset    No Known Problems Mother      No Known Problems Father        Exam:   Vitals: There were no vitals filed for this visit.  Wt Readings from Last 5 Encounters:   01/04/24 75.5 kg (166 lb 8 oz)   10/24/23 75 kg (165 lb 6.4 oz)   10/12/23 75.6 kg (166 lb 9.6 oz)   07/20/23 77.1 kg (170 lb 0.4 oz)   01/19/23 74 kg (163 lb 0.6 oz)      Constitutional:       Appearance: Healthy appearance. Not in distress.   Pulmonary:      Effort: Pulmonary effort is normal.      Breath sounds: Normal breath sounds.   Cardiovascular:      PMI at left midclavicular line. Normal rate. Regular rhythm. S1 with normal intensity. S2 with normal intensity.       Murmurs: There is a grade 2/6 systolic murmur at the URSB.   Pulses:     Intact distal pulses.   Edema:     Peripheral edema absent.   Neurological:      Mental Status: Alert and oriented to person, place, and time.       Labs:   Recent Labs     05/19/22  0756 05/15/22  0500 05/14/22  0801 05/13/22 2024 05/13/22  0649 05/12/22  0648 05/03/22  1057   WBC 7.5 8.4 8.8 10.8 11.0 12.1* 9.8   HGB 8.1* 8.4* 8.0* 7.7* 7.1* 7.5* 9.2*   HCT 26.3* 27.4* 24.3* 23.8* 22.8* 24.5* 29.8*    280 216 220 213 250 266   MCV 94 94 90 90 95 97 95     Recent Labs     05/19/22  0756 05/15/22  0500 05/14/22  0801 05/13/22  0649 05/12/22  0648 05/03/22  1057 10/29/21  0905 03/12/20  0733    141 136 137 138 137 140 140   K 4.1 4.3 4.0 4.1 4.3 4.4 4.5 4.4    107 105 106 104 103 106 104   BUN 26*  23 22 20 21 34* 34* 23   CREATININE 1.14 1.05 1.02 1.03 1.23 1.40* 1.45* 1.25      Recent Labs     04/23/21  0753   HGBA1C 5.5     Lab Results   Component Value Date    CHOL 138 10/13/2023    HDL 68.7 10/13/2023    LDLF 45 09/12/2022    TRIG 74 10/13/2023   LDL 10/13/2023:  45  Notable Studies: imaging personally reviewed and summarized by me below  EKG:  Encounter Date: 01/04/24   ECG 12 lead (Clinic Performed)   Result Value    Ventricular Rate 64    Atrial Rate 64    MO Interval 148    QRS Duration 84    QT Interval 396    QTC Calculation(Bazett) 408    P Axis 68    R Axis 42    T Axis 43    QRS Count 10    Q Onset 222    P Onset 148    P Offset 199    T Offset 420    QTC Fredericia 404    Narrative    Normal sinus rhythm  Normal ECG  When compared with ECG of 20-JUL-2023 11:29,  No significant change was found  Confirmed by Tyrese Hamilton ([id number]) on 2/18/2024 9:44:01 PM       Echo:  - Echo (1/19/2023)  PHYSICIAN INTERPRETATION:  Left Ventricle: The left ventricular systolic function is normal, with an estimated ejection fraction of 60-65%. There are no regional wall motion abnormalities. The left ventricular cavity size is normal. Spectral Doppler shows an impaired relaxation pattern of left ventricular diastolic filling.  Left Atrium: The left atrium is mildly dilated. Mobile atrial septum.  Right Ventricle: The right ventricle is normal in size. There is normal right ventricular global systolic function.  Right Atrium: The right atrium is normal in size.  Aortic Valve: The aortic valve is trileaflet. There is moderate aortic valve cusp calcification. There is no evidence of aortic valve regurgitation. The peak instantaneous gradient of the aortic valve is 32.3 mmHg. The mean gradient of the aortic valve is 15.0 mmHg. Moderately calcified and restricted AV with gradients of 32.3/15mmHg and DI of 0.44, consistent with mild AS. There is no AI.  Mitral Valve: The mitral valve is normal in structure. There is  moderate mitral annular calcification. There is trace mitral valve regurgitation.  Tricuspid Valve: The tricuspid valve is structurally normal. There is trace to mild tricuspid regurgitation. The Doppler estimated RVSP is slightly elevated at 32.8 mmHg.  Pulmonic Valve: The pulmonic valve is not well visualized. The pulmonic valve regurgitation was not well visualized.  Pericardium: There is a trivial pericardial effusion.  Aorta: The aortic root is normal. There is mild dilatation of the ascending aorta.  Systemic Veins: The inferior vena cava appears to be of normal size. There is IVC inspiratory collapse greater than 50%.  In comparison to the previous echocardiogram(s): Compared with study from 5/6/2022,. Compared with the prior exam from 2/1/2018 the AV gradients have increased from 21/8mmHg to 32/15mmHg. DI has decreased from 0.57 to 0.44. Overall progression from aortic sclerosis to mild stenosis.     CONCLUSIONS:   1. Left ventricular systolic function is normal with a 60-65% estimated ejection fraction.   2. Spectral Doppler shows an impaired relaxation pattern of left ventricular diastolic filling.   3. There is moderate mitral annular calcification.   4. Slightly elevated RVSP.   5. Moderately calcified and restricted AV with gradients of 32.3/15mmHg and DI of 0.44, consistent with mild AS. There is no AI.   6. There is moderate aortic valve cusp calcification.   7. Compared with the prior exam from 2/1/2018 the AV gradients have increased from 21/8mmHg to 32/15mmHg. DI has decreased from 0.57 to 0.44. Overall progression from aortic sclerosis to mild stenosis.        LE TERESITA 10/12/2023:  CONCLUSIONS:     Right Lower PVR: Evidence of mild arterial occlusive disease in the right lower extremity at rest. Decreased digital perfusion noted. Monophasic flow is noted in the right dorsalis pedis artery. Biphasic flow is noted in the right posterior tibial artery. Triphasic flow is noted in the right common  femoral artery. Severity of disease called by tracings due to partially calcified non-compressible vessels. TERESITA only per Joanne Garay     Left Lower PVR: No evidence of arterial occlusive disease in the left lower extremity at rest. Left pressures of >220 mmHg suggest no compressibility of vessels and may make absolute Segmental Limb Pressures (SLP) unreliable. Normal digital perfusion noted. Biphasic flow is noted in the left dorsalis pedis artery. Triphasic flow is noted in the left common femoral artery and left posterior tibial artery.    Current Outpatient Medications   Medication Instructions    aspirin 81 mg EC tablet 1 tablet, oral, Daily    docusate sodium (COLACE) 100 mg, oral, Daily with breakfast    enalapril (VASOTEC) 5 mg, oral, Daily, as directed    hydroxychloroquine (Plaquenil) 200 mg tablet TAKE 1 TABLET (200MG) ON MON,WED AND FRI AND 2 TABLETS (400MG) ON OTHER 4 DAYS    ketoconazole (NIZOral) 2 % shampoo PLEASE SEE ATTACHED FOR DETAILED DIRECTIONS    methotrexate (Trexall) 2.5 mg tablet 4 tablets, oral, Weekly    multivitamin tablet 1 tablet, oral, Daily    omeprazole (PRILOSEC) 20 mg, oral, Daily    predniSONE (DELTASONE) 5 mg, oral, Every morning    rosuvastatin (CRESTOR) 5 mg, oral, 2 times weekly    tamsulosin (FLOMAX) 0.4 mg, oral, Nightly     Impressions and Plan:    Pt is a mary anne 89 year old man with stable asymptomatic moderate carotid disease, hypertension, PMR with giant cell arteritis  and hyperlipidemia whose BP has been well controlled at home and he is able to walk a mile daily without CP or SOB though somewhat difficult given issues with his back and hip. He is tolerating his low dose rosuvastatin 2 x per week without myalgias.   Plan  Carotid disease-asx- continue aspirin 81 mg daily and rosuvastatin 5 mg twice weekly  HTN- continue current regimen including enalapril 5 mg daily . BP well controlled at home and in office today  Reduced toe perfusion on the right. Continue to  watch for any sores on feet.   Continue current level of exercise. Pt to continue working with PT 2 x per week and walking   Mild aortic stenosis. Will recheck echo on January 2025 unless new onset symptoms sooner.    return to clinic in 6 months    Patient Instructions:  If you have any questions or need cardiac medication refills, please call my office at [phone],      To reach my office please call [phone]  To schedule an appointment call [phone].          ____________________________________________________________  Joanne Garay MD  Division of Cardiovascular Medicine  [facility]  [facility]

## 2024-03-18 NOTE — PATIENT INSTRUCTIONS
Pain in  right lower extremity( muscle aches right leg upon waking which resolves upon rest)- Recent TERESITA significant stenoses other than reduced digital perfusion. May be from back issues.  Carotid disease-  most recent carotid ultrasound jan 2023- asymptomatic moderate disease left internal carotid and < 50% right internal carotid artery- continue aspirin 81 mg daily and rosuvastatin 5 mg daily 2 x per week  Hypertension- BP appears well controlled at home on  enalapril 5 mg daily. No change in meds. Continue current regimen. Continue to follow at home.   Continue current level of exercise/activity.   Mild aortic stenosis on most recent echo- will recheck echo January 2025 unless new onset sx ( as discussed)  Return to clinic in 6 months unless problems arise sooner.

## 2024-04-04 ENCOUNTER — APPOINTMENT (OUTPATIENT)
Dept: CARDIOLOGY | Facility: HOSPITAL | Age: 89
End: 2024-04-04
Payer: MEDICARE

## 2024-07-12 DIAGNOSIS — I10 HYPERTENSION, ESSENTIAL: Primary | ICD-10-CM

## 2024-07-12 DIAGNOSIS — I65.23 ASYMPTOMATIC BILATERAL CAROTID ARTERY STENOSIS: ICD-10-CM

## 2024-07-15 RX ORDER — ENALAPRIL MALEATE 5 MG/1
5 TABLET ORAL DAILY
Qty: 90 TABLET | Refills: 3 | Status: SHIPPED | OUTPATIENT
Start: 2024-07-15 | End: 2025-07-15

## 2024-07-15 RX ORDER — ROSUVASTATIN CALCIUM 5 MG/1
5 TABLET, COATED ORAL 2 TIMES WEEKLY
Qty: 24 TABLET | Refills: 3 | Status: SHIPPED | OUTPATIENT
Start: 2024-07-15 | End: 2025-07-15

## 2024-09-19 ENCOUNTER — OFFICE VISIT (OUTPATIENT)
Dept: CARDIOLOGY | Facility: HOSPITAL | Age: 89
End: 2024-09-19
Payer: MEDICARE

## 2024-09-19 VITALS
HEIGHT: 69 IN | SYSTOLIC BLOOD PRESSURE: 121 MMHG | DIASTOLIC BLOOD PRESSURE: 70 MMHG | OXYGEN SATURATION: 97 % | HEART RATE: 60 BPM | WEIGHT: 162.4 LBS | BODY MASS INDEX: 24.05 KG/M2

## 2024-09-19 DIAGNOSIS — E78.5 DYSLIPIDEMIA, GOAL LDL BELOW 70: ICD-10-CM

## 2024-09-19 DIAGNOSIS — I35.0 NONRHEUMATIC AORTIC VALVE STENOSIS: Primary | ICD-10-CM

## 2024-09-19 DIAGNOSIS — I65.23 ASYMPTOMATIC BILATERAL CAROTID ARTERY STENOSIS: ICD-10-CM

## 2024-09-19 DIAGNOSIS — I73.9 PERIPHERAL VASCULAR DISEASE (CMS-HCC): ICD-10-CM

## 2024-09-19 DIAGNOSIS — I10 HYPERTENSION, ESSENTIAL: ICD-10-CM

## 2024-09-19 LAB
ATRIAL RATE: 60 BPM
P AXIS: 20 DEGREES
P OFFSET: 201 MS
P ONSET: 144 MS
PR INTERVAL: 156 MS
Q ONSET: 222 MS
QRS COUNT: 10 BEATS
QRS DURATION: 96 MS
QT INTERVAL: 414 MS
QTC CALCULATION(BAZETT): 414 MS
QTC FREDERICIA: 414 MS
R AXIS: 59 DEGREES
T AXIS: 61 DEGREES
T OFFSET: 429 MS
VENTRICULAR RATE: 60 BPM

## 2024-09-19 PROCEDURE — 93005 ELECTROCARDIOGRAM TRACING: CPT | Performed by: INTERNAL MEDICINE

## 2024-09-19 PROCEDURE — 1159F MED LIST DOCD IN RCRD: CPT | Performed by: INTERNAL MEDICINE

## 2024-09-19 PROCEDURE — 1160F RVW MEDS BY RX/DR IN RCRD: CPT | Performed by: INTERNAL MEDICINE

## 2024-09-19 PROCEDURE — 99214 OFFICE O/P EST MOD 30 MIN: CPT | Performed by: INTERNAL MEDICINE

## 2024-09-19 PROCEDURE — 3074F SYST BP LT 130 MM HG: CPT | Performed by: INTERNAL MEDICINE

## 2024-09-19 PROCEDURE — G2211 COMPLEX E/M VISIT ADD ON: HCPCS | Performed by: INTERNAL MEDICINE

## 2024-09-19 PROCEDURE — 3078F DIAST BP <80 MM HG: CPT | Performed by: INTERNAL MEDICINE

## 2024-09-19 NOTE — PROGRESS NOTES
Primary Care Physician: No primary care provider on file.      Date of Visit: 09/19/2024 11:20 AM EDT  Location of visit: [facility]   Type of Visit: Established Patient     HPI / Summary:   Addy Souza is a very pleasant 89 y.o. male  with HTN, mild aortic stenosis, giant cell arteritis with stable asymptomatic moderate carotid stenosis who returns for follow up     Prior eval:  2/1/2018 TTE normal LV size and systolic function LVEF 65% with impaired relaxation , AV sclerosis with gradients of 21/8mmHg and no AI. moderate MAC with trivial MR  Carotid ultrasound 2/1/2018: RIght carotid < 50% no significant stenosis in R external, Left carotid 50-69%, stable c/w prior , no significant left external carotid  Followup vascular studies 2/26/2020: R carotid 50-60% prox SANDOVAL, No significant stenosis of R common carotid and right vertebral patent. Left carotid 50-60% prox LICA, > 50% left external carotid no hemodynamically significant stenosis of L common carotid or left subclavian. Left vertebral patent.  Carotid studies 5/6/2022: L ICA with 50-69% stenosis, SANDOVAL < 50% stenosis. Heterogenous plaque bilaterally     Does have PAD, with vascular studies done at [facility] with significant disease with reduced pressures in toe, but only mildly reduced ABIs. Denies any non-healing lesions on toes. Remains on aspirin without any bleeding or bruising issues. Denies cough on enalapril. In February 2022 pt had repeat carotid studies at [facility] which were stable since prior studies. Feb 7 2022: SANDOVAL 40-59%, Left with 60-79% stenosis, reported to be stable compared with prior from 2016.   preop eval included cardiac stress MRI: Dec 2021: Normal LV size and systolic function with LVEF 68%. no RWMA, no LV thrombus. Mild mid-myocardial enhancement of basal septal and lateral segments, no infiltration or infarction. Stress perfusion normal, IE no inducible ischemia. severe LAE, moderate RADHA. AV 34/24mmHg mild AI. moderate aortic  stenosis.     Echo from Jan 19 2023: normal LV size and fx 60-65% no RWMA, trileaflet AV calcified with gradients of 32.3/15mmHg with DI of 0.44 consistent with mild AS.      In spring of 2022 had back surgery for spinal stenosis and later had hip replacement. Was working hard with PT but still had issues with LE weakness so atorvastatin was stopped. Now on rosuvastatin 5 mg 2 x per week  without myalgias.    Home BP running 120s/70smmHg. He has no CP or SOB. No dizziness presyncope or syncope. No ACEi cough. He works out with  2 x per week each session 30 minutes ( working on strength) also does squats , wall push ups and other exercises at home and walks daily He says he is eeling well and has good energy level.        ROS: Full 10 pt review of symptoms of negative unless discussed above.     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    Personal history of malignant melanoma of skin 02/01/2018    History of malignant melanoma of skin    Personal history of other diseases of the musculoskeletal system and connective tissue 04/07/2014    Personal history of arthritis    Personal history of other diseases of the musculoskeletal system and connective tissue 02/01/2018    History of giant cell arteritis    Personal history of other diseases of the musculoskeletal system and connective tissue 02/01/2018    History of polymyalgia rheumatica     Surgical Hx:   Past Surgical History:   Procedure Laterality Date    TOTAL HIP  "ARTHROPLASTY  02/01/2018    Hip Replacement      Social Hx:   Tobacco Use: Medium Risk (9/19/2024)    Patient History     Smoking Tobacco Use: Former     Smokeless Tobacco Use: Never     Passive Exposure: Not on file     Alcohol Use: Not on file     Family Hx:   Family History   Problem Relation Name Age of Onset    No Known Problems Mother      No Known Problems Father        Exam:   Vitals:   Vitals:    09/19/24 1112   BP: 121/70   BP Location: Left arm   Pulse: 60   SpO2: 97%   Weight: 73.7 kg (162 lb 6.4 oz)   Height: 1.753 m (5' 9\")     Wt Readings from Last 5 Encounters:   09/19/24 73.7 kg (162 lb 6.4 oz)   03/18/24 73.9 kg (163 lb)   01/04/24 75.5 kg (166 lb 8 oz)   10/24/23 75 kg (165 lb 6.4 oz)   10/12/23 75.6 kg (166 lb 9.6 oz)      Constitutional:       Appearance: Healthy appearance. Not in distress.   Pulmonary:      Effort: Pulmonary effort is normal.      Breath sounds: Normal breath sounds.   Cardiovascular:      PMI at left midclavicular line. Normal rate. Regular rhythm. S1 with normal intensity. S2 with normal intensity.       Murmurs: There is a systolic murmur at the URSB.   Edema:     Peripheral edema absent.   Neurological:      Mental Status: Alert and oriented to person, place, and time.       Labs:   Recent Labs     05/19/22  0756 05/15/22  0500 05/14/22  0801 05/13/22 2024 05/13/22  0649 05/12/22  0648 05/03/22  1057   WBC 7.5 8.4 8.8 10.8 11.0 12.1* 9.8   HGB 8.1* 8.4* 8.0* 7.7* 7.1* 7.5* 9.2*   HCT 26.3* 27.4* 24.3* 23.8* 22.8* 24.5* 29.8*    280 216 220 213 250 266   MCV 94 94 90 90 95 97 95     Recent Labs     05/19/22  0756 05/15/22  0500 05/14/22  0801 05/13/22  0649 05/12/22  0648 05/03/22  1057 10/29/21  0905 03/12/20  0733    141 136 137 138 137 140 140   K 4.1 4.3 4.0 4.1 4.3 4.4 4.5 4.4    107 105 106 104 103 106 104   BUN 26* 23 22 20 21 34* 34* 23   CREATININE 1.14 1.05 1.02 1.03 1.23 1.40* 1.45* 1.25      Recent Labs     08/22/24  0844 04/23/21  0753 "   HGBA1C 5.3 5.5     Lab Results   Component Value Date    CHOL 138 10/13/2023    HDL 68.7 10/13/2023    LDLF 45 09/12/2022    TRIG 74 10/13/2023     Lab Results   Component Value Date    LDLCALC 55 10/13/2023      Notable Studies: imaging personally reviewed and summarized by me below  EKG:  Encounter Date: 09/19/24   ECG 12 lead (Clinic Performed)   Result Value    Ventricular Rate 60    Atrial Rate 60    ND Interval 156    QRS Duration 96    QT Interval 414    QTC Calculation(Bazett) 414    P Axis 20    R Axis 59    T Axis 61    QRS Count 10    Q Onset 222    P Onset 144    P Offset 201    T Offset 429    QTC Fredericia 414    Narrative    Normal sinus rhythm  Normal ECG  When compared with ECG of 04-JAN-2024 10:52,  No significant change was found         PVRs 10/2023:  CONCLUSIONS:     Right Lower PVR: Evidence of mild arterial occlusive disease in the right lower extremity at rest. Decreased digital perfusion noted. Monophasic flow is noted in the right dorsalis pedis artery. Biphasic flow is noted in the right posterior tibial artery. Triphasic flow is noted in the right common femoral artery. Severity of disease called by tracings due to partially calcified non-compressible vessels. TERESITA only per Joanne Garay     Left Lower PVR: No evidence of arterial occlusive disease in the left lower extremity at rest. Left pressures of >220 mmHg suggest no compressibility of vessels and may make absolute Segmental Limb Pressures (SLP) unreliable. Normal digital perfusion noted. Biphasic flow is noted in the left dorsalis pedis artery. Triphasic flow is noted in the left common femoral artery and left posterior tibial artery.     Echo 1/19/2023  PHYSICIAN INTERPRETATION:  Left Ventricle: The left ventricular systolic function is normal, with an estimated ejection fraction of 60-65%. There are no regional wall motion abnormalities. The left ventricular cavity size is normal. Spectral Doppler shows an impaired relaxation  pattern of left ventricular diastolic filling.  Left Atrium: The left atrium is mildly dilated. Mobile atrial septum.  Right Ventricle: The right ventricle is normal in size. There is normal right ventricular global systolic function.  Right Atrium: The right atrium is normal in size.  Aortic Valve: The aortic valve is trileaflet. There is moderate aortic valve cusp calcification. There is no evidence of aortic valve regurgitation. The peak instantaneous gradient of the aortic valve is 32.3 mmHg. The mean gradient of the aortic valve is 15.0 mmHg. Moderately calcified and restricted AV with gradients of 32.3/15mmHg and DI of 0.44, consistent with mild AS. There is no AI.  Mitral Valve: The mitral valve is normal in structure. There is moderate mitral annular calcification. There is trace mitral valve regurgitation.  Tricuspid Valve: The tricuspid valve is structurally normal. There is trace to mild tricuspid regurgitation. The Doppler estimated RVSP is slightly elevated at 32.8 mmHg.  Pulmonic Valve: The pulmonic valve is not well visualized. The pulmonic valve regurgitation was not well visualized.  Pericardium: There is a trivial pericardial effusion.  Aorta: The aortic root is normal. There is mild dilatation of the ascending aorta.  Systemic Veins: The inferior vena cava appears to be of normal size. There is IVC inspiratory collapse greater than 50%.  In comparison to the previous echocardiogram(s): Compared with study from 5/6/2022,. Compared with the prior exam from 2/1/2018 the AV gradients have increased from 21/8mmHg to 32/15mmHg. DI has decreased from 0.57 to 0.44. Overall progression from aortic sclerosis to mild stenosis.        CONCLUSIONS:   1. Left ventricular systolic function is normal with a 60-65% estimated ejection fraction.   2. Spectral Doppler shows an impaired relaxation pattern of left ventricular diastolic filling.   3. There is moderate mitral annular calcification.   4. Slightly elevated  RVSP.   5. Moderately calcified and restricted AV with gradients of 32.3/15mmHg and DI of 0.44, consistent with mild AS. There is no AI.   6. There is moderate aortic valve cusp calcification.   7. Compared with the prior exam from 2/1/2018 the AV gradients have increased from 21/8mmHg to 32/15mmHg. DI has decreased from 0.57 to 0.44. Overall progression from aortic sclerosis to mild stenosis.      Current Outpatient Medications   Medication Instructions    aspirin 81 mg EC tablet 1 tablet, oral, Daily    docusate sodium (COLACE) 100 mg, oral, Daily with breakfast    enalapril (VASOTEC) 5 mg, oral, Daily, as directed    hydroxychloroquine (Plaquenil) 200 mg tablet TAKE 1 TABLET (200MG) ON MON,WED AND FRI AND 2 TABLETS (400MG) ON OTHER 4 DAYS    ketoconazole (NIZOral) 2 % shampoo PLEASE SEE ATTACHED FOR DETAILED DIRECTIONS    methotrexate (Trexall) 2.5 mg tablet 4 tablets, oral, Once Weekly    multivitamin tablet 1 tablet, oral, Daily    predniSONE (DELTASONE) 5 mg, oral, Every morning    rosuvastatin (CRESTOR) 5 mg, oral, 2 times weekly    tamsulosin (FLOMAX) 0.4 mg, oral, Nightly     Impressions and Plan:    Pt is a mary anne 9 year old man with stable asymptomatic moderate carotid disease, hypertension, PMR with giant cell arteritis  and hyperlipidemia whose BP has been well controlled at home and he is able to walk a mile daily without CP or SOB though somewhat difficult given issues with his back and hip. He is tolerating his low dose rosuvastatin 2 x per week without myalgias.   Plan  Carotid disease-asx- continue aspirin 81 mg daily and rosuvastatin 5 mg twice weekly. Due for fasting lipid panel  HTN- continue current regimen including enalapril 5 mg daily . BP well controlled at home and in office today  Reduced toe perfusion on the right. Continue to watch for any sores on feet.   Continue current level of exercise. Pt to continue working with PT 2 x per week and walking   Mild aortic stenosis. Will recheck  echo on January 2025 unless new onset symptoms sooner.    return to clinic in January with echo and carotid ultrasound at that time.     Patient Instructions:  If you have any questions or need cardiac medication refills, please call my office at [phone],      To reach my office please call [phone]  To schedule an appointment call [phone].          ____________________________________________________________  Joanne Garay MD  Division of Cardiovascular Medicine  [facility]  [facility]

## 2024-09-19 NOTE — PATIENT INSTRUCTIONS
Pain in  right lower extremity( muscle aches right leg upon waking which resolves upon rest)- Recent TERESITA without significant stenoses other than reduced digital perfusion. May be from back issues.  Carotid disease-  most recent carotid ultrasound jan 2023- asymptomatic moderate disease left internal carotid and < 50% right internal carotid artery- continue aspirin 81 mg daily and rosuvastatin 5 mg daily 2 x per week. Will recheck carotid ultrasounds in January 2025.   Hypertension- BP appears well controlled at home on  enalapril 5 mg daily. No change in meds. Continue current regimen. Continue to follow at home.   Continue current level of exercise/activity.   Mild aortic stenosis on most recent echo- will recheck echo January 2025 unless new onset sx ( as discussed)   Lipids- due for fasting lipid panel  Return to clinic in January 2025 with echo at that time. ( Also carotid ultrasound)

## 2024-09-26 ENCOUNTER — LAB (OUTPATIENT)
Dept: LAB | Facility: LAB | Age: 89
End: 2024-09-26
Payer: MEDICARE

## 2024-09-26 DIAGNOSIS — I65.23 ASYMPTOMATIC BILATERAL CAROTID ARTERY STENOSIS: ICD-10-CM

## 2024-09-26 DIAGNOSIS — I10 HYPERTENSION, ESSENTIAL: ICD-10-CM

## 2024-09-26 LAB
ALBUMIN SERPL BCP-MCNC: 4.2 G/DL (ref 3.4–5)
ALP SERPL-CCNC: 47 U/L (ref 33–136)
ALT SERPL W P-5'-P-CCNC: 25 U/L (ref 10–52)
ANION GAP SERPL CALC-SCNC: 10 MMOL/L (ref 10–20)
AST SERPL W P-5'-P-CCNC: 21 U/L (ref 9–39)
BILIRUB SERPL-MCNC: 0.7 MG/DL (ref 0–1.2)
BUN SERPL-MCNC: 24 MG/DL (ref 6–23)
CALCIUM SERPL-MCNC: 8.9 MG/DL (ref 8.6–10.3)
CHLORIDE SERPL-SCNC: 103 MMOL/L (ref 98–107)
CHOLEST SERPL-MCNC: 140 MG/DL (ref 0–199)
CHOLESTEROL/HDL RATIO: 2.3
CO2 SERPL-SCNC: 29 MMOL/L (ref 21–32)
CREAT SERPL-MCNC: 1.35 MG/DL (ref 0.5–1.3)
EGFRCR SERPLBLD CKD-EPI 2021: 50 ML/MIN/1.73M*2
GLUCOSE SERPL-MCNC: 85 MG/DL (ref 74–99)
HDLC SERPL-MCNC: 59.8 MG/DL
LDLC SERPL CALC-MCNC: 64 MG/DL
NON HDL CHOLESTEROL: 80 MG/DL (ref 0–149)
POTASSIUM SERPL-SCNC: 4.2 MMOL/L (ref 3.5–5.3)
PROT SERPL-MCNC: 6 G/DL (ref 6.4–8.2)
SODIUM SERPL-SCNC: 138 MMOL/L (ref 136–145)
TRIGL SERPL-MCNC: 81 MG/DL (ref 0–149)
VLDL: 16 MG/DL (ref 0–40)

## 2024-09-26 PROCEDURE — 80061 LIPID PANEL: CPT

## 2024-09-26 PROCEDURE — 80053 COMPREHEN METABOLIC PANEL: CPT

## 2024-09-26 PROCEDURE — 36415 COLL VENOUS BLD VENIPUNCTURE: CPT

## 2024-10-21 ENCOUNTER — HOSPITAL ENCOUNTER (OUTPATIENT)
Dept: VASCULAR MEDICINE | Facility: HOSPITAL | Age: 89
Discharge: HOME | End: 2024-10-21
Payer: MEDICARE

## 2024-10-21 DIAGNOSIS — I65.23 ASYMPTOMATIC BILATERAL CAROTID ARTERY STENOSIS: ICD-10-CM

## 2024-10-21 PROCEDURE — 93880 EXTRACRANIAL BILAT STUDY: CPT

## 2024-10-21 PROCEDURE — 93880 EXTRACRANIAL BILAT STUDY: CPT | Performed by: STUDENT IN AN ORGANIZED HEALTH CARE EDUCATION/TRAINING PROGRAM

## 2025-01-20 ENCOUNTER — HOSPITAL ENCOUNTER (OUTPATIENT)
Dept: CARDIOLOGY | Facility: HOSPITAL | Age: OVER 89
Discharge: HOME | End: 2025-01-20
Payer: MEDICARE

## 2025-01-20 ENCOUNTER — APPOINTMENT (OUTPATIENT)
Dept: CARDIOLOGY | Facility: HOSPITAL | Age: OVER 89
End: 2025-01-20
Payer: MEDICARE

## 2025-01-20 VITALS — HEIGHT: 69 IN | BODY MASS INDEX: 23.99 KG/M2 | WEIGHT: 162 LBS

## 2025-01-20 VITALS
BODY MASS INDEX: 22.22 KG/M2 | WEIGHT: 150 LBS | HEIGHT: 69 IN | OXYGEN SATURATION: 98 % | DIASTOLIC BLOOD PRESSURE: 61 MMHG | HEART RATE: 57 BPM | SYSTOLIC BLOOD PRESSURE: 147 MMHG

## 2025-01-20 DIAGNOSIS — I65.23 ASYMPTOMATIC BILATERAL CAROTID ARTERY STENOSIS: ICD-10-CM

## 2025-01-20 DIAGNOSIS — I35.0 NONRHEUMATIC AORTIC VALVE STENOSIS: Primary | ICD-10-CM

## 2025-01-20 DIAGNOSIS — R00.1 BRADYCARDIA: ICD-10-CM

## 2025-01-20 DIAGNOSIS — I10 HYPERTENSION, ESSENTIAL: ICD-10-CM

## 2025-01-20 DIAGNOSIS — I35.0 NONRHEUMATIC AORTIC VALVE STENOSIS: ICD-10-CM

## 2025-01-20 LAB
AORTIC VALVE MEAN GRADIENT: 36 MMHG
AORTIC VALVE PEAK VELOCITY: 3.88 M/S
AV PEAK GRADIENT: 60 MMHG
AVA (PEAK VEL): 1.07 CM2
AVA (VTI): 0.97 CM2
EJECTION FRACTION APICAL 4 CHAMBER: 60.6
EJECTION FRACTION: 62 %
LEFT ATRIUM VOLUME AREA LENGTH INDEX BSA: 46.2 ML/M2
LEFT VENTRICLE INTERNAL DIMENSION DIASTOLE: 4.43 CM (ref 3.5–6)
LEFT VENTRICULAR OUTFLOW TRACT DIAMETER: 2.01 CM
MITRAL VALVE E/A RATIO: 0.89
RIGHT VENTRICLE FREE WALL PEAK S': 14 CM/S
RIGHT VENTRICLE PEAK SYSTOLIC PRESSURE: 36.3 MMHG
TRICUSPID ANNULAR PLANE SYSTOLIC EXCURSION: 2.1 CM

## 2025-01-20 PROCEDURE — 3077F SYST BP >= 140 MM HG: CPT | Performed by: INTERNAL MEDICINE

## 2025-01-20 PROCEDURE — 99214 OFFICE O/P EST MOD 30 MIN: CPT | Performed by: INTERNAL MEDICINE

## 2025-01-20 PROCEDURE — 1160F RVW MEDS BY RX/DR IN RCRD: CPT | Performed by: INTERNAL MEDICINE

## 2025-01-20 PROCEDURE — 3078F DIAST BP <80 MM HG: CPT | Performed by: INTERNAL MEDICINE

## 2025-01-20 PROCEDURE — 93306 TTE W/DOPPLER COMPLETE: CPT | Performed by: INTERNAL MEDICINE

## 2025-01-20 PROCEDURE — 99214 OFFICE O/P EST MOD 30 MIN: CPT | Mod: 25 | Performed by: INTERNAL MEDICINE

## 2025-01-20 PROCEDURE — 93306 TTE W/DOPPLER COMPLETE: CPT

## 2025-01-20 PROCEDURE — 1159F MED LIST DOCD IN RCRD: CPT | Performed by: INTERNAL MEDICINE

## 2025-01-20 PROCEDURE — G2211 COMPLEX E/M VISIT ADD ON: HCPCS | Performed by: INTERNAL MEDICINE

## 2025-01-20 ASSESSMENT — ENCOUNTER SYMPTOMS
LOSS OF SENSATION IN FEET: 0
DEPRESSION: 0
OCCASIONAL FEELINGS OF UNSTEADINESS: 0

## 2025-01-20 NOTE — PATIENT INSTRUCTIONS
Carotid disease-asx- continue aspirin 81 mg daily and rosuvastatin 5 mg twice weekly. Lipids well controlled. Follow up ultrasound with less significant stenoses than previously noted   HTN- continue current regimen including enalapril 5 mg daily . BP well controlled at home   Reduced toe perfusion on the right. Continue to watch for any sores on feet.   Continue current level of exercise. Pt to continue working with PT 2 x per week and walking   Mild aortic stenosis. Echocardiogram done today. Will review and call patient with results  Return to clinic in 6 months

## 2025-01-20 NOTE — PROGRESS NOTES
Primary Care Physician: No primary care provider on file.      Date of Visit: 01/20/2025 10:40 AM EST  Location of visit: [facility]   Type of Visit: Established Patient     HPI / Summary:   Addy Souza is a very pleasant 89 y.o. male  with HTN, mild aortic stenosis, giant cell arteritis with stable asymptomatic moderate carotid stenosis who returns for follow up     Prior eval:  2/1/2018 TTE normal LV size and systolic function LVEF 65% with impaired relaxation , AV sclerosis with gradients of 21/8mmHg and no AI. moderate MAC with trivial MR  Carotid ultrasound 2/1/2018: RIght carotid < 50% no significant stenosis in R external, Left carotid 50-69%, stable c/w prior , no significant left external carotid  Followup vascular studies 2/26/2020: R carotid 50-60% prox SANDOVAL, No significant stenosis of R common carotid and right vertebral patent. Left carotid 50-60% prox LICA, > 50% left external carotid no hemodynamically significant stenosis of L common carotid or left subclavian. Left vertebral patent.  Carotid studies 5/6/2022: L ICA with 50-69% stenosis, SANDOVAL < 50% stenosis. Heterogenous plaque bilaterally     Does have PAD, with vascular studies done at [facility] with significant disease with reduced pressures in toe, but only mildly reduced ABIs. Denies any non-healing lesions on toes. Remains on aspirin without any bleeding or bruising issues. Denies cough on enalapril. In February 2022 pt had repeat carotid studies at [facility] which were stable since prior studies. Feb 7 2022: SANDOVAL 40-59%, Left with 60-79% stenosis, reported to be stable compared with prior from 2016.   preop eval included cardiac stress MRI: Dec 2021: Normal LV size and systolic function with LVEF 68%. no RWMA, no LV thrombus. Mild mid-myocardial enhancement of basal septal and lateral segments, no infiltration or infarction. Stress perfusion normal, IE no inducible ischemia. severe LAE, moderate RADHA. AV 34/24mmHg mild AI. moderate aortic  stenosis.     Echo from Jan 19 2023: normal LV size and fx 60-65% no RWMA, trileaflet AV calcified with gradients of 32.3/15mmHg with DI of 0.44 consistent with mild AS.      In spring of 2022 had back surgery for spinal stenosis and later had hip replacement. Was working hard with PT but still had issues with LE weakness so atorvastatin was stopped. Now on rosuvastatin 5 mg 2 x per week  without myalgias.    BP at home 130s/60smmHg with HR high 50s to low 60w bpm. No dizziness or fatigue though feels overall getting weaker over time. Continues to work with  2 x per week and also walks 30 minutes daily. Sicne his back surgery his walking is a little uneven. No muscle aches with statin. Has no CP or SOB at rest or on exertion.        ROS: Full 10 pt review of symptoms of negative unless discussed above.     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    Personal history of malignant melanoma of skin 02/01/2018    History of malignant melanoma of skin    Personal history of other diseases of the musculoskeletal system and connective tissue 04/07/2014    Personal history of arthritis    Personal history of other diseases of the musculoskeletal system and connective tissue 02/01/2018    History of giant cell arteritis    Personal history of other diseases of the musculoskeletal system and connective tissue 02/01/2018    History of polymyalgia rheumatica     Surgical Hx:   Past Surgical History:   Procedure Laterality Date     "TOTAL HIP ARTHROPLASTY  02/01/2018    Hip Replacement      Social Hx:   Tobacco Use: Medium Risk (1/20/2025)    Patient History     Smoking Tobacco Use: Former     Smokeless Tobacco Use: Never     Passive Exposure: Not on file     Alcohol Use: Not on file     Family Hx:   Family History   Problem Relation Name Age of Onset    No Known Problems Mother      No Known Problems Father        Exam:   Vitals:   Vitals:    01/20/25 1017   BP: 147/61   Pulse: 57   SpO2: 98%   Weight: 68 kg (150 lb)   Height: 1.753 m (5' 9\")     Wt Readings from Last 5 Encounters:   01/20/25 73.5 kg (162 lb)   01/20/25 68 kg (150 lb)   09/19/24 73.7 kg (162 lb 6.4 oz)   03/18/24 73.9 kg (163 lb)   01/04/24 75.5 kg (166 lb 8 oz)      Constitutional:       Appearance: Healthy appearance. Not in distress.   Pulmonary:      Effort: Pulmonary effort is normal.      Breath sounds: Normal breath sounds.   Cardiovascular:      PMI at left midclavicular line. Normal rate. Regular rhythm. S1 with normal intensity. S2 with normal intensity.       Murmurs: There is a grade 2/6 harsh midsystolic murmur at the URSB, radiating to the neck.   Pulses:     Intact distal pulses.   Edema:     Peripheral edema absent.   Neurological:      Mental Status: Alert and oriented to person, place, and time.       Labs:   Recent Labs     05/19/22  0756 05/15/22  0500 05/14/22  0801 05/13/22 2024 05/13/22  0649 05/12/22  0648 05/03/22  1057   WBC 7.5 8.4 8.8 10.8 11.0 12.1* 9.8   HGB 8.1* 8.4* 8.0* 7.7* 7.1* 7.5* 9.2*   HCT 26.3* 27.4* 24.3* 23.8* 22.8* 24.5* 29.8*    280 216 220 213 250 266   MCV 94 94 90 90 95 97 95     Recent Labs     09/26/24  0850 05/19/22  0756 05/15/22  0500 05/14/22  0801 05/13/22  0649 05/12/22  0648 05/03/22  1057 10/29/21  0905    139 141 136 137 138 137 140   K 4.2 4.1 4.3 4.0 4.1 4.3 4.4 4.5    106 107 105 106 104 103 106   BUN 24* 26* 23 22 20 21 34* 34*   CREATININE 1.35* 1.14 1.05 1.02 1.03 1.23 1.40* 1.45*      Recent " Labs     08/22/24  0844 04/23/21  0753   HGBA1C 5.3 5.5     Lab Results   Component Value Date    CHOL 140 09/26/2024    HDL 59.8 09/26/2024    LDLF 45 09/12/2022    TRIG 81 09/26/2024     Lab Results   Component Value Date    LDLCALC 64 09/26/2024      Notable Studies: imaging personally reviewed and summarized by me below  EKG:  Encounter Date: 09/19/24   ECG 12 lead (Clinic Performed)   Result Value    Ventricular Rate 60    Atrial Rate 60    CT Interval 156    QRS Duration 96    QT Interval 414    QTC Calculation(Bazett) 414    P Axis 20    R Axis 59    T Axis 61    QRS Count 10    Q Onset 222    P Onset 144    P Offset 201    T Offset 429    QTC Fredericia 414    Narrative    Normal sinus rhythm  Normal ECG  When compared with ECG of 04-JAN-2024 10:52,  No significant change was found  Confirmed by Yandel Nunez [id number]) on 9/28/2024 2:17:32 PM     12 lead EKG today- sinus bradycardia at 57 bpm, otherwise normal    PVRs 10/2023:  CONCLUSIONS:     Right Lower PVR: Evidence of mild arterial occlusive disease in the right lower extremity at rest. Decreased digital perfusion noted. Monophasic flow is noted in the right dorsalis pedis artery. Biphasic flow is noted in the right posterior tibial artery. Triphasic flow is noted in the right common femoral artery. Severity of disease called by tracings due to partially calcified non-compressible vessels. TERESITA only per Joanne Garay     Left Lower PVR: No evidence of arterial occlusive disease in the left lower extremity at rest. Left pressures of >220 mmHg suggest no compressibility of vessels and may make absolute Segmental Limb Pressures (SLP) unreliable. Normal digital perfusion noted. Biphasic flow is noted in the left dorsalis pedis artery. Triphasic flow is noted in the left common femoral artery and left posterior tibial artery.     Echo 1/19/2023  PHYSICIAN INTERPRETATION:  Left Ventricle: The left ventricular systolic function is normal, with an estimated  ejection fraction of 60-65%. There are no regional wall motion abnormalities. The left ventricular cavity size is normal. Spectral Doppler shows an impaired relaxation pattern of left ventricular diastolic filling.  Left Atrium: The left atrium is mildly dilated. Mobile atrial septum.  Right Ventricle: The right ventricle is normal in size. There is normal right ventricular global systolic function.  Right Atrium: The right atrium is normal in size.  Aortic Valve: The aortic valve is trileaflet. There is moderate aortic valve cusp calcification. There is no evidence of aortic valve regurgitation. The peak instantaneous gradient of the aortic valve is 32.3 mmHg. The mean gradient of the aortic valve is 15.0 mmHg. Moderately calcified and restricted AV with gradients of 32.3/15mmHg and DI of 0.44, consistent with mild AS. There is no AI.  Mitral Valve: The mitral valve is normal in structure. There is moderate mitral annular calcification. There is trace mitral valve regurgitation.  Tricuspid Valve: The tricuspid valve is structurally normal. There is trace to mild tricuspid regurgitation. The Doppler estimated RVSP is slightly elevated at 32.8 mmHg.  Pulmonic Valve: The pulmonic valve is not well visualized. The pulmonic valve regurgitation was not well visualized.  Pericardium: There is a trivial pericardial effusion.  Aorta: The aortic root is normal. There is mild dilatation of the ascending aorta.  Systemic Veins: The inferior vena cava appears to be of normal size. There is IVC inspiratory collapse greater than 50%.  In comparison to the previous echocardiogram(s): Compared with study from 5/6/2022,. Compared with the prior exam from 2/1/2018 the AV gradients have increased from 21/8mmHg to 32/15mmHg. DI has decreased from 0.57 to 0.44. Overall progression from aortic sclerosis to mild stenosis.        CONCLUSIONS:   1. Left ventricular systolic function is normal with a 60-65% estimated ejection fraction.   2.  Spectral Doppler shows an impaired relaxation pattern of left ventricular diastolic filling.   3. There is moderate mitral annular calcification.   4. Slightly elevated RVSP.   5. Moderately calcified and restricted AV with gradients of 32.3/15mmHg and DI of 0.44, consistent with mild AS. There is no AI.   6. There is moderate aortic valve cusp calcification.   7. Compared with the prior exam from 2/1/2018 the AV gradients have increased from 21/8mmHg to 32/15mmHg. DI has decreased from 0.57 to 0.44. Overall progression from aortic sclerosis to mild stenosis.     Carotid ultrasound 10/21/2024  CONCLUSIONS:  Right Carotid: Findings are consistent with less than 50% stenosis of the right proximal internal carotid artery. Laminar flow seen by color Doppler. Right external carotid artery appears patent with no evidence of stenosis. No evidence of hemodynamically significant stenosis of the right common carotid artery. The right vertebral artery is patent with antegrade flow. No evidence of hemodynamically significant stenosis in the right subclavian artery.  Left Carotid: Findings are consistent with less than 50% stenosis of the left proximal internal carotid artery. Laminar flow seen by color Doppler. Left external carotid artery appears patent with no evidence of stenosis. No evidence of hemodynamically significant stenosis of the left common carotid artery. The left vertebral artery is patent with antegrade flow. No evidence of hemodynamically significant stenosis in the left subclavian artery.     Comparison:  Compared with study from 5/6/2022, On todays exam unable to obtain the same degree of stenosis on the left ICA proximal segment.     Imaging & Doppler Findings:  Right Plaque Morph: The proximal right internal carotid artery demonstrates heterogenous and calcified plaque. The proximal right external carotid artery demonstrates heterogenous and calcified plaque. The distal right common carotid artery  demonstrates heterogenous plaque. The proximal right subclavian artery demonstrates heterogenous plaque. The right carotid bulb demonstrates heterogenous and calcified plaque.  Left Plaque Morph: The proximal left internal carotid artery demonstrates heterogenous and calcified plaque. The proximal left external carotid artery demonstrates heterogenous and calcified plaque. The proximal left common carotid artery demonstrates heterogenous plaque. The distal left common carotid artery demonstrates heterogenous plaque. The left carotid bulb demonstrates heterogenous and calcified plaque.    Echo 1/20/2025  PHYSICIAN INTERPRETATION:  Left Ventricle: Left ventricular ejection fraction is normal, calculated by Simon's biplane at 62%. There are no regional left ventricular wall motion abnormalities. The left ventricular cavity size is normal. There is normal septal and normal posterior left ventricular wall thickness. There is left ventricular concentric remodeling. Spectral Doppler shows a Grade I (impaired relaxation pattern) of left ventricular diastolic filling with normal left atrial filling pressure.  Left Atrium: The left atrium is moderately dilated.  Right Ventricle: The right ventricle is normal in size. There is normal right ventricular global systolic function.  Right Atrium: The right atrium is mildly dilated.  Aortic Valve: The aortic valve appears abnormal. The aortic valve dimensionless index is 0.31. There is no evidence of aortic valve regurgitation. The peak instantaneous gradient of the aortic valve is 60 mmHg. The mean gradient of the aortic valve is 36 mmHg.  Mitral Valve: The mitral valve is normal in structure. There is moderate mitral annular calcification. There is trace mitral valve regurgitation.  Tricuspid Valve: The tricuspid valve is structurally normal. There is trace to mild tricuspid regurgitation. The Doppler estimated RVSP is mildly elevated at 36.3 mmHg.  Pulmonic Valve: The pulmonic  valve is structurally normal. There is physiologic pulmonic valve regurgitation.  Pericardium: There is no pericardial effusion noted.  Aorta: The aortic root is normal. There is mild dilatation of the ascending aorta.  Systemic Veins: The inferior vena cava appears normal in size.  In comparison to the previous echocardiogram(s): Compared with study dated 1/19/2023,. Compared with the prior exam from 1/19/2023 the prior AV gradients were 32.3/15mmHg with DI of 0.44 consistent iwth mild AS. There has been a significant increase in the gradients to 60/36mmHg with DI down to 0.31 consistent with progression of AS from mild to now moderate. LV systolic function remains preserved. Prior RVSP was 32.8mmHg and onlyslightly higher at 36mmHg.        CONCLUSIONS:   1. Left ventricular ejection fraction is normal, calculated by Siomn's biplane at 62%.   2. Spectral Doppler shows a Grade I (impaired relaxation pattern) of left ventricular diastolic filling with normal left atrial filling pressure.   3. There is normal right ventricular global systolic function.   4. The left atrium is moderately dilated.   5. There is moderate mitral annular calcification.   6. Mildly elevated right ventricular systolic pressure.   7. Compared with the prior exam from 1/19/2023 the prior AV gradients were 32.3/15mmHg with DI of 0.44 consistent iwth mild AS. There has been a significant increase in the gradients to 60/36mmHg with DI down to 0.31 consistent with progression of AS from mild to now moderate. LV systolic function remains preserved. Prior RVSP was 32.8mmHg and onlyslightly higher at 36mmHg.       Current Outpatient Medications   Medication Instructions    aspirin 81 mg EC tablet 1 tablet, Daily    docusate sodium (COLACE) 100 mg, Daily with breakfast    enalapril (VASOTEC) 5 mg, oral, Daily, as directed    hydroxychloroquine (Plaquenil) 200 mg tablet TAKE 1 TABLET (200MG) ON MON,WED AND FRI AND 2 TABLETS (400MG) ON OTHER 4 DAYS     ketoconazole (NIZOral) 2 % shampoo PLEASE SEE ATTACHED FOR DETAILED DIRECTIONS    methotrexate (Trexall) 2.5 mg tablet 4 tablets, Once Weekly    multivitamin tablet 1 tablet, Daily    predniSONE (DELTASONE) 5 mg, Every morning    rosuvastatin (CRESTOR) 5 mg, oral, 2 times weekly    tamsulosin (FLOMAX) 0.4 mg, Nightly     Impressions and Plan:    Pt is a mary anne 89 year old man with stable asymptomatic moderate carotid disease, hypertension, PMR with giant cell arteritis  and hyperlipidemia whose BP has been well controlled at home and he is able to walk a mile daily without CP or SOB though somewhat difficult given issues with his back and hip. He is tolerating his low dose rosuvastatin 2 x per week without myalgias. His echocardiogram today does show progression fo aortic stenosis from mild to now moderate. Will follow with repeat echo in 1 year unless symptoms sooner.   Plan  Carotid disease-asx- continue aspirin 81 mg daily and rosuvastatin 5 mg twice weekly. Lipids well controlled.  HTN- continue current regimen including enalapril 5 mg daily . BP well controlled at home   Reduced toe perfusion on the right. Continue to watch for any sores on feet.   Continue current level of exercise. Pt to continue working with PT 2 x per week and walking   Aortic stenosis- progressed from mild to moderate Will recheck echo Januar 2026  unless new onset symptoms sooner.      return to clinic in 6 months.       Patient Instructions:  If you have any questions or need cardiac medication refills, please call my office at [phone],      To reach my office please call [phone]  To schedule an appointment call [phone].          ____________________________________________________________  Joanne Garay MD  Division of Cardiovascular Medicine  [facility]  [facility]

## 2025-01-22 ENCOUNTER — HOSPITAL ENCOUNTER (OUTPATIENT)
Dept: CARDIOLOGY | Facility: HOSPITAL | Age: OVER 89
Discharge: HOME | End: 2025-01-22
Payer: MEDICARE

## 2025-01-22 PROCEDURE — 93005 ELECTROCARDIOGRAM TRACING: CPT

## 2025-01-23 LAB
ATRIAL RATE: 57 BPM
P AXIS: -16 DEGREES
P OFFSET: 204 MS
P ONSET: 151 MS
PR INTERVAL: 144 MS
Q ONSET: 223 MS
QRS COUNT: 9 BEATS
QRS DURATION: 98 MS
QT INTERVAL: 428 MS
QTC CALCULATION(BAZETT): 416 MS
QTC FREDERICIA: 420 MS
R AXIS: 54 DEGREES
T AXIS: 73 DEGREES
T OFFSET: 437 MS
VENTRICULAR RATE: 57 BPM

## 2025-01-23 PROCEDURE — 93005 ELECTROCARDIOGRAM TRACING: CPT

## 2025-07-24 ENCOUNTER — OFFICE VISIT (OUTPATIENT)
Dept: CARDIOLOGY | Facility: HOSPITAL | Age: OVER 89
End: 2025-07-24
Payer: MEDICARE

## 2025-07-24 VITALS
BODY MASS INDEX: 23.7 KG/M2 | WEIGHT: 160 LBS | SYSTOLIC BLOOD PRESSURE: 144 MMHG | HEIGHT: 69 IN | OXYGEN SATURATION: 99 % | DIASTOLIC BLOOD PRESSURE: 69 MMHG | HEART RATE: 64 BPM

## 2025-07-24 DIAGNOSIS — R06.09 DOE (DYSPNEA ON EXERTION): ICD-10-CM

## 2025-07-24 DIAGNOSIS — I10 HYPERTENSION, ESSENTIAL: Primary | ICD-10-CM

## 2025-07-24 DIAGNOSIS — I35.0 NONRHEUMATIC AORTIC VALVE STENOSIS: ICD-10-CM

## 2025-07-24 DIAGNOSIS — I35.0 NONRHEUMATIC AORTIC (VALVE) STENOSIS: ICD-10-CM

## 2025-07-24 DIAGNOSIS — R07.9 CHEST PAIN, UNSPECIFIED TYPE: ICD-10-CM

## 2025-07-24 DIAGNOSIS — I20.9 ANGINA PECTORIS, UNSPECIFIED: ICD-10-CM

## 2025-07-24 LAB
ATRIAL RATE: 64 BPM
P AXIS: 53 DEGREES
P OFFSET: 199 MS
P ONSET: 147 MS
PR INTERVAL: 148 MS
Q ONSET: 221 MS
QRS COUNT: 10 BEATS
QRS DURATION: 100 MS
QT INTERVAL: 410 MS
QTC CALCULATION(BAZETT): 422 MS
QTC FREDERICIA: 418 MS
R AXIS: 36 DEGREES
T AXIS: 57 DEGREES
T OFFSET: 426 MS
VENTRICULAR RATE: 64 BPM

## 2025-07-24 PROCEDURE — G2211 COMPLEX E/M VISIT ADD ON: HCPCS | Performed by: INTERNAL MEDICINE

## 2025-07-24 PROCEDURE — 1160F RVW MEDS BY RX/DR IN RCRD: CPT | Performed by: INTERNAL MEDICINE

## 2025-07-24 PROCEDURE — 3077F SYST BP >= 140 MM HG: CPT | Performed by: INTERNAL MEDICINE

## 2025-07-24 PROCEDURE — 99212 OFFICE O/P EST SF 10 MIN: CPT

## 2025-07-24 PROCEDURE — 1159F MED LIST DOCD IN RCRD: CPT | Performed by: INTERNAL MEDICINE

## 2025-07-24 PROCEDURE — 99214 OFFICE O/P EST MOD 30 MIN: CPT | Performed by: INTERNAL MEDICINE

## 2025-07-24 PROCEDURE — 3078F DIAST BP <80 MM HG: CPT | Performed by: INTERNAL MEDICINE

## 2025-07-24 PROCEDURE — 93010 ELECTROCARDIOGRAM REPORT: CPT | Performed by: INTERNAL MEDICINE

## 2025-07-24 PROCEDURE — 93005 ELECTROCARDIOGRAM TRACING: CPT | Performed by: INTERNAL MEDICINE

## 2025-07-24 NOTE — PROGRESS NOTES
Primary Care Physician: No primary care provider on file.      Date of Visit: 07/24/2025  9:40 AM EDT  Location of visit: [facility]   Type of Visit: Established Patient     HPI / Summary:   Addy Souza is a very pleasant 89 y.o. male  with HTN, mild aortic stenosis, giant cell arteritis with stable asymptomatic moderate carotid stenosis who returns for follow up     Prior eval:  2/1/2018 TTE normal LV size and systolic function LVEF 65% with impaired relaxation , AV sclerosis with gradients of 21/8mmHg and no AI. moderate MAC with trivial MR  Carotid ultrasound 2/1/2018: RIght carotid < 50% no significant stenosis in R external, Left carotid 50-69%, stable c/w prior , no significant left external carotid  Followup vascular studies 2/26/2020: R carotid 50-60% prox SANDOVAL, No significant stenosis of R common carotid and right vertebral patent. Left carotid 50-60% prox LICA, > 50% left external carotid no hemodynamically significant stenosis of L common carotid or left subclavian. Left vertebral patent.  Carotid studies 5/6/2022: L ICA with 50-69% stenosis, SANDOVAL < 50% stenosis. Heterogenous plaque bilaterally     Does have PAD, with vascular studies done at [facility] with significant disease with reduced pressures in toe, but only mildly reduced ABIs. Denies any non-healing lesions on toes. Remains on aspirin without any bleeding or bruising issues. Denies cough on enalapril. In February 2022 pt had repeat carotid studies at [facility] which were stable since prior studies. Feb 7 2022: SANDOVAL 40-59%, Left with 60-79% stenosis, reported to be stable compared with prior from 2016.   preop eval included cardiac stress MRI: Dec 2021: Normal LV size and systolic function with LVEF 68%. no RWMA, no LV thrombus. Mild mid-myocardial enhancement of basal septal and lateral segments, no infiltration or infarction. Stress perfusion normal, IE no inducible ischemia. severe LAE, moderate RADHA. AV 34/24mmHg mild AI. moderate aortic  stenosis.     Echo from Jan 19 2023: normal LV size and fx 60-65% no RWMA, trileaflet AV calcified with gradients of 32.3/15mmHg with DI of 0.44 consistent with mild AS.      In spring of 2022 had back surgery for spinal stenosis and later had hip replacement. Was working hard with PT but still had issues with LE weakness so atorvastatin was stopped. Now on rosuvastatin 5 mg 2 x per week  without myalgias.    ROS: Full 10 pt review of symptoms of negative unless discussed above.     Problems:   Problem List[1]  Medical History:   Medical History[2]  Surgical Hx:   Surgical History[3]   Social Hx:   Tobacco Use: Medium Risk (1/20/2025)    Patient History     Smoking Tobacco Use: Former     Smokeless Tobacco Use: Never     Passive Exposure: Not on file     Alcohol Use: Not on file     Family Hx:   Family History[4]   Exam:   Vitals: There were no vitals filed for this visit.  Wt Readings from Last 5 Encounters:   01/20/25 73.5 kg (162 lb)   01/20/25 68 kg (150 lb)   09/19/24 73.7 kg (162 lb 6.4 oz)   03/18/24 73.9 kg (163 lb)   01/04/24 75.5 kg (166 lb 8 oz)      Physical Exam  Labs:   Recent Labs     05/19/22  0756 05/15/22  0500 05/14/22  0801 05/13/22 2024 05/13/22  0649 05/12/22  0648 05/03/22  1057   WBC 7.5 8.4 8.8 10.8 11.0 12.1* 9.8   HGB 8.1* 8.4* 8.0* 7.7* 7.1* 7.5* 9.2*   HCT 26.3* 27.4* 24.3* 23.8* 22.8* 24.5* 29.8*    280 216 220 213 250 266   MCV 94 94 90 90 95 97 95     Recent Labs     09/26/24  0850 05/19/22  0756 05/15/22  0500 05/14/22  0801 05/13/22  0649 05/12/22  0648 05/03/22  1057 10/29/21  0905    139 141 136 137 138 137 140   K 4.2 4.1 4.3 4.0 4.1 4.3 4.4 4.5    106 107 105 106 104 103 106   BUN 24* 26* 23 22 20 21 34* 34*   CREATININE 1.35* 1.14 1.05 1.02 1.03 1.23 1.40* 1.45*      Recent Labs     08/22/24  0844 04/23/21  0753   HGBA1C 5.3 5.5     Lab Results   Component Value Date    CHOL 140 09/26/2024    HDL 59.8 09/26/2024    LDLCALC 64 09/26/2024    TRIG 81 09/26/2024      Notable Studies: imaging personally reviewed and summarized by me below  EKG:  Encounter Date: 01/20/25   ECG 12 lead (Clinic Performed)   Result Value    Ventricular Rate 57    Atrial Rate 57    MS Interval 144    QRS Duration 98    QT Interval 428    QTC Calculation(Bazett) 416    P Axis -16    R Axis 54    T Axis 73    QRS Count 9    Q Onset 223    P Onset 151    P Offset 204    T Offset 437    QTC Fredericia 420    Narrative    Sinus bradycardia  Otherwise normal ECG  When compared with ECG of 19-SEP-2024 11:09,  No significant change was found  Confirmed by Yandel Nunez ([id number]) on 1/28/2025 8:44:37 AM       Imaging:  No results found for this or any previous visit.     MRI:  === Results for orders placed in visit on 12/10/21 ===    MR CARDIAC W AND WO IV CONTRAST W REGADENOSON STRESS FOR MORPH/FUNCT AND VALVE DZ [[id number]] 12/10/2021    Status: Normal    PVRs 10/2023:  CONCLUSIONS:     Right Lower PVR: Evidence of mild arterial occlusive disease in the right lower extremity at rest. Decreased digital perfusion noted. Monophasic flow is noted in the right dorsalis pedis artery. Biphasic flow is noted in the right posterior tibial artery. Triphasic flow is noted in the right common femoral artery. Severity of disease called by tracings due to partially calcified non-compressible vessels. TERESITA only per Joanne Garay     Left Lower PVR: No evidence of arterial occlusive disease in the left lower extremity at rest. Left pressures of >220 mmHg suggest no compressibility of vessels and may make absolute Segmental Limb Pressures (SLP) unreliable. Normal digital perfusion noted. Biphasic flow is noted in the left dorsalis pedis artery. Triphasic flow is noted in the left common femoral artery and left posterior tibial artery.        Carotid ultrasound 10/21/2024  CONCLUSIONS:  Right Carotid: Findings are consistent with less than 50% stenosis of the right proximal internal carotid artery. Laminar flow seen by color  Doppler. Right external carotid artery appears patent with no evidence of stenosis. No evidence of hemodynamically significant stenosis of the right common carotid artery. The right vertebral artery is patent with antegrade flow. No evidence of hemodynamically significant stenosis in the right subclavian artery.  Left Carotid: Findings are consistent with less than 50% stenosis of the left proximal internal carotid artery. Laminar flow seen by color Doppler. Left external carotid artery appears patent with no evidence of stenosis. No evidence of hemodynamically significant stenosis of the left common carotid artery. The left vertebral artery is patent with antegrade flow. No evidence of hemodynamically significant stenosis in the left subclavian artery.     Comparison:  Compared with study from 5/6/2022, On todays exam unable to obtain the same degree of stenosis on the left ICA proximal segment.     Imaging & Doppler Findings:  Right Plaque Morph: The proximal right internal carotid artery demonstrates heterogenous and calcified plaque. The proximal right external carotid artery demonstrates heterogenous and calcified plaque. The distal right common carotid artery demonstrates heterogenous plaque. The proximal right subclavian artery demonstrates heterogenous plaque. The right carotid bulb demonstrates heterogenous and calcified plaque.  Left Plaque Morph: The proximal left internal carotid artery demonstrates heterogenous and calcified plaque. The proximal left external carotid artery demonstrates heterogenous and calcified plaque. The proximal left common carotid artery demonstrates heterogenous plaque. The distal left common carotid artery demonstrates heterogenous plaque. The left carotid bulb demonstrates heterogenous and calcified plaque.     Echo 1/20/2025  PHYSICIAN INTERPRETATION:  Left Ventricle: Left ventricular ejection fraction is normal, calculated by Simon's biplane at 62%. There are no regional  left ventricular wall motion abnormalities. The left ventricular cavity size is normal. There is normal septal and normal posterior left ventricular wall thickness. There is left ventricular concentric remodeling. Spectral Doppler shows a Grade I (impaired relaxation pattern) of left ventricular diastolic filling with normal left atrial filling pressure.  Left Atrium: The left atrium is moderately dilated.  Right Ventricle: The right ventricle is normal in size. There is normal right ventricular global systolic function.  Right Atrium: The right atrium is mildly dilated.  Aortic Valve: The aortic valve appears abnormal. The aortic valve dimensionless index is 0.31. There is no evidence of aortic valve regurgitation. The peak instantaneous gradient of the aortic valve is 60 mmHg. The mean gradient of the aortic valve is 36 mmHg.  Mitral Valve: The mitral valve is normal in structure. There is moderate mitral annular calcification. There is trace mitral valve regurgitation.  Tricuspid Valve: The tricuspid valve is structurally normal. There is trace to mild tricuspid regurgitation. The Doppler estimated RVSP is mildly elevated at 36.3 mmHg.  Pulmonic Valve: The pulmonic valve is structurally normal. There is physiologic pulmonic valve regurgitation.  Pericardium: There is no pericardial effusion noted.  Aorta: The aortic root is normal. There is mild dilatation of the ascending aorta.  Systemic Veins: The inferior vena cava appears normal in size.  In comparison to the previous echocardiogram(s): Compared with study dated 1/19/2023,. Compared with the prior exam from 1/19/2023 the prior AV gradients were 32.3/15mmHg with DI of 0.44 consistent iwth mild AS. There has been a significant increase in the gradients to 60/36mmHg with DI down to 0.31 consistent with progression of AS from mild to now moderate. LV systolic function remains preserved. Prior RVSP was 32.8mmHg and onlyslightly higher at 36mmHg.         CONCLUSIONS:   1. Left ventricular ejection fraction is normal, calculated by Simon's biplane at 62%.   2. Spectral Doppler shows a Grade I (impaired relaxation pattern) of left ventricular diastolic filling with normal left atrial filling pressure.   3. There is normal right ventricular global systolic function.   4. The left atrium is moderately dilated.   5. There is moderate mitral annular calcification.   6. Mildly elevated right ventricular systolic pressure.   7. Compared with the prior exam from 1/19/2023 the prior AV gradients were 32.3/15mmHg with DI of 0.44 consistent iwth mild AS. There has been a significant increase in the gradients to 60/36mmHg with DI down to 0.31 consistent with progression of AS from mild to now moderate. LV systolic function remains preserved. Prior RVSP was 32.8mmHg and onlyslightly higher at 36mmHg.           Current Outpatient Medications   Medication Instructions    aspirin 81 mg EC tablet 1 tablet, Daily    docusate sodium (COLACE) 100 mg, Daily with breakfast    enalapril (VASOTEC) 5 mg, oral, Daily, as directed    hydroxychloroquine (Plaquenil) 200 mg tablet TAKE 1 TABLET (200MG) ON MON,WED AND FRI AND 2 TABLETS (400MG) ON OTHER 4 DAYS    ketoconazole (NIZOral) 2 % shampoo PLEASE SEE ATTACHED FOR DETAILED DIRECTIONS    methotrexate (Trexall) 2.5 mg tablet 4 tablets, Once Weekly    multivitamin tablet 1 tablet, Daily    predniSONE (DELTASONE) 5 mg, Every morning    rosuvastatin (CRESTOR) 5 mg, oral, 2 times weekly    tamsulosin (FLOMAX) 0.4 mg, Nightly     Impressions and Plan:    #      Patient Instructions:  If you have any questions or need cardiac medication refills, please call my office at [phone],      To reach my office please call [phone]  To schedule an appointment call [phone].          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    Personal history of malignant melanoma of skin 02/01/2018    History of malignant melanoma of skin    Personal history of other diseases of the musculoskeletal system and connective tissue 04/07/2014    Personal history of arthritis    Personal history of other diseases of the musculoskeletal system and connective tissue 02/01/2018    History of giant cell arteritis    Personal history of other diseases of the musculoskeletal system and connective tissue 02/01/2018    History of polymyalgia rheumatica   [3]   Past Surgical History:  Procedure Laterality Date    TOTAL HIP ARTHROPLASTY  02/01/2018    Hip Replacement   [4]   Family History  Problem Relation Name Age of Onset    No Known Problems Mother      No Known Problems Father        than 50%.  In comparison to the previous echocardiogram(s): Compared with study dated 1/20/2025, The LVEF remains unchanged ( previousky 62% but the AV gradients have increased from 60/36mmHg to 72/40mmHg and the DI is simiklar previously b0.31 now 0.3. Still no AI. ( The patient has become symptomatic in the interval.).        CONCLUSIONS:   1. Left ventricular ejection fraction is normal calculated by Simon's biplane at 63%.   2. Spectral Doppler shows a Grade I (impaired relaxation pattern) of left ventricular diastolic filling with normal left atrial filling pressure.   3. There is normal right ventricular global systolic function.   4. The left atrium is mildly dilated.   5. There is moderate mitral annular calcification.   6. The Doppler estimated RVSP is slightly elevated at 33 mmHg.   7. Severe aortic valve stenosis. The peak and mean gradients are 72 mmHg and 40 mmHg respectively.   8. There is severe aortic valve cusp calcification.   9. Compared with study dated 1/20/2025, The LVEF remains unchanged ( previousky 62% but the AV gradients have increased from 60/36mmHg to 72/40mmHg and the DI is simiklar previously b0.31 now 0.3. Still no AI. ( The patient has become symptomatic in the interval.).      Current Outpatient Medications   Medication Instructions    aspirin 81 mg EC tablet 1 tablet, Daily    docusate sodium (COLACE) 100 mg, Daily with breakfast    enalapril (VASOTEC) 5 mg, oral, Daily, as directed    hydroxychloroquine (Plaquenil) 200 mg tablet TAKE 1 TABLET (200MG) ON MON,WED AND FRI AND 2 TABLETS (400MG) ON OTHER 4 DAYS    ketoconazole (NIZOral) 2 % shampoo PLEASE SEE ATTACHED FOR DETAILED DIRECTIONS    methotrexate (Trexall) 2.5 mg tablet 4 tablets, Once Weekly    multivitamin tablet 1 tablet, Daily    predniSONE (DELTASONE) 5 mg, Every morning    rosuvastatin (CRESTOR) 5 mg, oral, 2 times weekly    tamsulosin (FLOMAX) 0.4 mg, Nightly     Impressions and Plan:    Pt is a mary anne 89 year old  man with stable asymptomatic moderate carotid disease, hypertension, PMR with giant cell arteritis  and hyperlipidemia whose BP has been well controlled at home.  He is tolerating his low dose rosuvastatin 2 x per week without myalgias. He has noted new onset of MCCLELLAN over the past couple of months and his aortic stenosis has progressed to severe symptomatic aortic stenosis at this time with preserved LV systolic function. We discussed TAVR as a possible option for rx and he would like to proceed with work up for TAVR and consultation with the structural team to see if he is an appropriate candidate. Discussed that he will need a left heart cath as part of the assessment since he could have concomitant CAD contributing to his sx.   Plan  Carotid disease-asx- continue aspirin 81 mg daily and rosuvastatin 5 mg twice weekly. Lipids well controlled.  HTN- continue current regimen including enalapril 5 mg daily . BP well controlled at home   Reduced toe perfusion on the right. Continue to watch for any sores on feet.   Aortic stenosis- progressed to severe with symptoms. Will refer to structural team/Dr Mae for consideration of TAVR.    precath labs ordered    Return to clinic in 4 months     Patient Instructions:  If you have any questions or need cardiac medication refills, please call my office at [phone],      To reach my office please call [phone]  To schedule an appointment call [phone].          ____________________________________________________________  Joanne Garay MD  Division of Cardiovascular Medicine  [facility] [place]  [facility]          [1]   Patient Active Problem List  Diagnosis    Aortic stenosis    Asymptomatic bilateral carotid artery stenosis    Bradycardia    Callus of foot    Plantar fasciitis    Cardiac murmur    Hypertension, essential    Peripheral vascular disease    Rotator cuff arthropathy of left shoulder    Esophageal stricture     Hiatal hernia with GERD    Nonrheumatic aortic (valve) stenosis    Angina pectoris, unspecified    Chest pain    MCCLELLAN (dyspnea on exertion)   [2]   Past Medical History:  Diagnosis Date    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/2018    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    Personal history of malignant melanoma of skin 02/01/2018    History of malignant melanoma of skin    Personal history of other diseases of the musculoskeletal system and connective tissue 04/07/2014    Personal history of arthritis    Personal history of other diseases of the musculoskeletal system and connective tissue 02/01/2018    History of giant cell arteritis    Personal history of other diseases of the musculoskeletal system and connective tissue 02/01/2018    History of polymyalgia rheumatica   [3]   Past Surgical History:  Procedure Laterality Date    TOTAL HIP ARTHROPLASTY  02/01/2018    Hip Replacement   [4]   Family History  Problem Relation Name Age of Onset    No Known Problems Mother      No Known Problems Father

## 2025-07-24 NOTE — PATIENT INSTRUCTIONS
Carotid disease-asx- continue aspirin 81 mg daily and rosuvastatin 5 mg twice weekly. Lipids well controlled. Follow up ultrasound with less significant stenoses than previously noted   HTN- continue current regimen including enalapril 5 mg daily . BP well controlled at home   Reduced toe perfusion on the right. Continue to watch for any sores on feet.    Aortic stenosis. - echocardiogram done January 2025 with moderate or moderate to severe aortic stenosis. Now with shortness of breath and chest pain on exertion. Likely Aortic stenosis has progressed. Will repeat echo and refer to structural ( [facility]) for consideration of TAVR.  ( Will need left heart cath as p[art of TAVR work up.)   Return to clinic in 4months  Precath labs ordered.

## 2025-07-25 ENCOUNTER — HOSPITAL ENCOUNTER (OUTPATIENT)
Dept: CARDIOLOGY | Facility: HOSPITAL | Age: OVER 89
Discharge: HOME | End: 2025-07-25
Payer: MEDICARE

## 2025-07-25 DIAGNOSIS — I35.0 NONRHEUMATIC AORTIC VALVE STENOSIS: ICD-10-CM

## 2025-07-25 LAB
AORTIC VALVE MEAN GRADIENT: 40 MMHG
AORTIC VALVE PEAK VELOCITY: 4.23 M/S
AV PEAK GRADIENT: 72 MMHG
AVA (PEAK VEL): 1.01 CM2
AVA (VTI): 0.97 CM2
EJECTION FRACTION APICAL 4 CHAMBER: 60.7
EJECTION FRACTION: 63 %
LEFT ATRIUM VOLUME AREA LENGTH INDEX BSA: 40 ML/M2
LEFT VENTRICLE INTERNAL DIMENSION DIASTOLE: 4.39 CM (ref 3.5–6)
LEFT VENTRICULAR OUTFLOW TRACT DIAMETER: 2 CM
MITRAL VALVE E/A RATIO: 0.71
RIGHT VENTRICLE FREE WALL PEAK S': 15.6 CM/S
RIGHT VENTRICLE PEAK SYSTOLIC PRESSURE: 33 MMHG
TRICUSPID ANNULAR PLANE SYSTOLIC EXCURSION: 2.5 CM

## 2025-07-25 PROCEDURE — 93306 TTE W/DOPPLER COMPLETE: CPT | Performed by: INTERNAL MEDICINE

## 2025-07-25 PROCEDURE — 93306 TTE W/DOPPLER COMPLETE: CPT

## 2025-07-28 PROBLEM — I20.9 ANGINA PECTORIS, UNSPECIFIED: Status: ACTIVE | Noted: 2025-07-28

## 2025-07-28 PROBLEM — R07.9 CHEST PAIN: Status: ACTIVE | Noted: 2025-07-28

## 2025-07-28 PROBLEM — R06.09 DOE (DYSPNEA ON EXERTION): Status: ACTIVE | Noted: 2025-07-28

## 2025-07-28 PROBLEM — I35.0 NONRHEUMATIC AORTIC (VALVE) STENOSIS: Status: ACTIVE | Noted: 2025-07-28

## 2025-07-29 ENCOUNTER — TELEPHONE (OUTPATIENT)
Dept: CARDIOLOGY | Facility: HOSPITAL | Age: OVER 89
End: 2025-07-29
Payer: MEDICARE

## 2025-07-29 NOTE — TELEPHONE ENCOUNTER
Received vociemail requesting call back inquiring if need to fast for lab work. Called back 7 left message no need to fast for lab work. Left message plan to eat breakfast on 8-4-25 by 8am  prior to appt & then only cleqarliquids & water encouraged pre & post CT scan to be scheduled the same day after clinic appt.

## 2025-07-31 ENCOUNTER — TELEPHONE (OUTPATIENT)
Dept: CARDIOLOGY | Facility: HOSPITAL | Age: OVER 89
End: 2025-07-31
Payer: MEDICARE

## 2025-07-31 DIAGNOSIS — I35.0 NONRHEUMATIC AORTIC (VALVE) STENOSIS: Primary | ICD-10-CM

## 2025-07-31 LAB
ANION GAP SERPL CALCULATED.4IONS-SCNC: 9 MMOL/L (CALC) (ref 7–17)
ATRIAL RATE: 64 BPM
BUN SERPL-MCNC: 27 MG/DL (ref 7–25)
BUN/CREAT SERPL: 22 (CALC) (ref 6–22)
CALCIUM SERPL-MCNC: 9 MG/DL (ref 8.6–10.3)
CHLORIDE SERPL-SCNC: 104 MMOL/L (ref 98–110)
CO2 SERPL-SCNC: 25 MMOL/L (ref 20–32)
CREAT SERPL-MCNC: 1.24 MG/DL (ref 0.7–1.22)
EGFRCR SERPLBLD CKD-EPI 2021: 55 ML/MIN/1.73M2
ERYTHROCYTE [DISTWIDTH] IN BLOOD BY AUTOMATED COUNT: 13 % (ref 11–15)
GLUCOSE SERPL-MCNC: 110 MG/DL (ref 65–99)
HCT VFR BLD AUTO: 35 % (ref 38.5–50)
HGB BLD-MCNC: 11.5 G/DL (ref 13.2–17.1)
INR PPP: 1
MCH RBC QN AUTO: 33.2 PG (ref 27–33)
MCHC RBC AUTO-ENTMCNC: 32.9 G/DL (ref 32–36)
MCV RBC AUTO: 101.2 FL (ref 80–100)
P AXIS: 53 DEGREES
P OFFSET: 199 MS
P ONSET: 147 MS
PLATELET # BLD AUTO: 183 THOUSAND/UL (ref 140–400)
PMV BLD REES-ECKER: 9.6 FL (ref 7.5–12.5)
POTASSIUM SERPL-SCNC: 4.2 MMOL/L (ref 3.5–5.3)
PR INTERVAL: 148 MS
PROTHROMBIN TIME: 10.6 SEC (ref 9–11.5)
Q ONSET: 221 MS
QRS COUNT: 10 BEATS
QRS DURATION: 100 MS
QT INTERVAL: 410 MS
QTC CALCULATION(BAZETT): 422 MS
QTC FREDERICIA: 418 MS
R AXIS: 36 DEGREES
RBC # BLD AUTO: 3.46 MILLION/UL (ref 4.2–5.8)
SODIUM SERPL-SCNC: 138 MMOL/L (ref 135–146)
T AXIS: 57 DEGREES
T OFFSET: 426 MS
VENTRICULAR RATE: 64 BPM
WBC # BLD AUTO: 7.4 THOUSAND/UL (ref 3.8–10.8)

## 2025-07-31 NOTE — TELEPHONE ENCOUNTER
Received voicemail requesting call back to confirm times of appointments on 8-4. Called back & left detailed information on appointment location & times along with prep for CT scan being scheduled the same day after clinic appointments.

## 2025-08-04 ENCOUNTER — HOSPITAL ENCOUNTER (OUTPATIENT)
Dept: RADIOLOGY | Facility: HOSPITAL | Age: OVER 89
Discharge: HOME | End: 2025-08-04
Payer: MEDICARE

## 2025-08-04 ENCOUNTER — OFFICE VISIT (OUTPATIENT)
Dept: CARDIAC SURGERY | Facility: HOSPITAL | Age: OVER 89
End: 2025-08-04
Payer: MEDICARE

## 2025-08-04 ENCOUNTER — OFFICE VISIT (OUTPATIENT)
Dept: CARDIOLOGY | Facility: HOSPITAL | Age: OVER 89
End: 2025-08-04
Payer: MEDICARE

## 2025-08-04 VITALS
OXYGEN SATURATION: 99 % | SYSTOLIC BLOOD PRESSURE: 169 MMHG | BODY MASS INDEX: 23.79 KG/M2 | HEART RATE: 61 BPM | DIASTOLIC BLOOD PRESSURE: 83 MMHG | WEIGHT: 160.6 LBS | HEIGHT: 69 IN

## 2025-08-04 DIAGNOSIS — I35.0 NONRHEUMATIC AORTIC (VALVE) STENOSIS: Primary | ICD-10-CM

## 2025-08-04 DIAGNOSIS — I35.0 SEVERE AORTIC STENOSIS: Primary | ICD-10-CM

## 2025-08-04 DIAGNOSIS — I35.0 NONRHEUMATIC AORTIC (VALVE) STENOSIS: ICD-10-CM

## 2025-08-04 PROCEDURE — 99202 OFFICE O/P NEW SF 15 MIN: CPT | Performed by: STUDENT IN AN ORGANIZED HEALTH CARE EDUCATION/TRAINING PROGRAM

## 2025-08-04 PROCEDURE — 71275 CT ANGIOGRAPHY CHEST: CPT | Performed by: STUDENT IN AN ORGANIZED HEALTH CARE EDUCATION/TRAINING PROGRAM

## 2025-08-04 PROCEDURE — 74174 CTA ABD&PLVS W/CONTRAST: CPT | Performed by: STUDENT IN AN ORGANIZED HEALTH CARE EDUCATION/TRAINING PROGRAM

## 2025-08-04 PROCEDURE — 2550000001 HC RX 255 CONTRASTS: Performed by: INTERNAL MEDICINE

## 2025-08-04 PROCEDURE — 99212 OFFICE O/P EST SF 10 MIN: CPT

## 2025-08-04 PROCEDURE — 99212 OFFICE O/P EST SF 10 MIN: CPT | Mod: 25

## 2025-08-04 PROCEDURE — 71275 CT ANGIOGRAPHY CHEST: CPT

## 2025-08-04 RX ADMIN — IOHEXOL 80 ML: 350 INJECTION, SOLUTION INTRAVENOUS at 10:28

## 2025-08-04 ASSESSMENT — ENCOUNTER SYMPTOMS
LOSS OF SENSATION IN FEET: 1
OCCASIONAL FEELINGS OF UNSTEADINESS: 1
DEPRESSION: 0

## 2025-08-04 ASSESSMENT — PAIN SCALES - GENERAL: PAINLEVEL_OUTOF10: 0-NO PAIN

## 2025-08-04 NOTE — PROGRESS NOTES
[facility] Questionnaire      1  Heart failure affects different people in different ways. Some feel shortness of breath while others feel fatigue. Please indicate how much you are limited by heart failure (shortness of breath or fatigue) in your ability to do the following activities over the past 2 weeks. PRE PROCEDURE    A.) Showering/bathing  3. Moderately  B.) Walking 1 block on level ground 1. Extremely  C.) Hurrying or Jogging   1. Extremely    2.  Over the past 2 weeks, how many times did you have swelling in your feet, ankles or legs when you woke up in the morning? 2. 3 or more times per week but not every day    3.  Over the past 2 weeks, on average, how many times has fatigue limited your ability to do what you wanted? 4. 3 or more times a week but not every day    4.  Over the past 2 weeks, on average, how many times has shortness of breath limited your ability to do what you wanted? 4. 3 or more times a week but not every day    5.  Over the past 2 weeks, on average, how many times have you been forced to sleep sitting up in a chair or with at least 3 pillows to prop you up because of shortness of breath? Never    6. Over the past 2 weeks, how much has your heart failure limited your enjoyment of life? It has slightly limited my enjoyment of life    7. If you had to spend the rest of your life with your heart failure the way it is right now, how would you feel about this? 3. Somewhat satisfied    8. How much does your heart failure affect your lifestyle? Please indicate how your heart failure may have limited yourparticipation in the following activities over the past 2 weeks    A.)  Hobbies, recreational activities  3. Moderately limited    B.) Working or doing household chores  3. Moderately limited    C.) Visiting family or friends out of your home  3. Moderately limited    5 Meter Walk____16.5______seconds

## 2025-08-04 NOTE — PROGRESS NOTES
PCP: None  Cards: Jarrod    PMH:   Specialty Problems          Cardiology Problems    Aortic stenosis        Asymptomatic bilateral carotid artery stenosis        Bradycardia        Hypertension, essential        Peripheral vascular disease        Angina pectoris, unspecified        Chest pain        Nonrheumatic aortic (valve) stenosis            HPI    89 y.o. y/o male with PMH of presents for evaluation of severe, symptomatic aortic stenosis.  Patient relates a several month history of worsening fatigue, SOB, MCCLELLAN.  Denies overt orthopnea, PND, exertional CP.  Has occasional dizziness.  Denies syncope or presyncope. He is able to walk approx 1 mile daily but has been taking more frequent breaks than previously. Works with a  2 days per week. Denies increased abdominal girth, edema.  Gradually doing less and less activity secondary to symptoms.    Full 14 point ROS complete and negative except as noted above.     Structural Workup:     - NYHA: I  - Echo: I personally reviewed the ECHO with the following parameters  EF   Date/Time Value Ref Range Status   07/25/2025 12:09 PM 63 %    , mod-severe aortic stenosis, AV mean gradient 40, AV Vmax 4.23, BJ 0.97   - EKG: I personally review the EKG   Encounter Date: 07/24/25   ECG 12 lead (Clinic Performed)   Result Value    Ventricular Rate 64    Atrial Rate 64    [place] Interval 148    QRS Duration 100    QT Interval 410    QTC Calculation(Bazett) 422    P Axis 53    R Axis 36    T Axis 57    QRS Count 10    Q Onset 221    P Onset 147    P Offset 199    T Offset 426    QTC Fredericia 418    Narrative    Sinus rhythm with Premature atrial complexes  Otherwise normal ECG  When compared with ECG of 20-JAN-2025 10:13,  Premature atrial complexes are now Present  Confirmed by Yandel Nunez ([id number]) on 7/31/2025 7:59:38 AM      - [facility], pending  - CT TAVR: pending  - Dental clearance:  regular dentist visits q6 months, no issues.   - EFT 1/5  - STS for AVR    Procedure  Type: Isolated AVR  Perioperative Outcome Estimate %  Operative Mortality 4.09%  Morbidity & Mortality 9.57%  Stroke 1.82%  Renal Failure 2.17%  Reoperation 3.58%  Prolonged Ventilation 4.35%  Deep Sternal Wound Infection 0.028%  Long Hospital Stay (>14 days) 6.26%  Short Hospital Stay (<6 days)* 31.3%      Social History     Tobacco Use    Smoking status: Former     Types: Cigarettes    Smokeless tobacco: Never   Substance Use Topics    Alcohol use: Yes     Alcohol/week: 7.0 standard drinks of alcohol     Types: 7 Glasses of wine per week        Family History[1]     RX Allergies[2]     Current Outpatient Medications   Medication Instructions    aspirin 81 mg EC tablet 1 tablet, Daily    docusate sodium (COLACE) 100 mg, Daily with breakfast    enalapril (VASOTEC) 5 mg, oral, Daily, as directed    hydroxychloroquine (Plaquenil) 200 mg tablet TAKE 1 TABLET (200MG) ON MON,WED AND FRI AND 2 TABLETS (400MG) ON OTHER 4 DAYS    ketoconazole (NIZOral) 2 % shampoo PLEASE SEE ATTACHED FOR DETAILED DIRECTIONS    methotrexate (Trexall) 2.5 mg tablet 4 tablets, Once Weekly    multivitamin tablet 1 tablet, Daily    predniSONE (DELTASONE) 5 mg, Every morning    rosuvastatin (CRESTOR) 5 mg, oral, 2 times weekly    tamsulosin (FLOMAX) 0.4 mg, Nightly        Vitals:    08/04/25 0933   BP: 169/83   Pulse:    SpO2:         Physical Exam:     General:  Alert, calm, well-developed   HEENT:  Pupils equal, round, reactive to light and accommodation. Extraocular movements   Neck:  Supple, without lymphadenopathy or thyromegaly. No carotid bruits.  Lymph:  No axillary, cervical, supraclavicular, pre-auricular, submental, or occipital lymphadenopathy,  Cardiovascular:  Regular rate and rhythm, with normal S1 and S2. Aortic murmurs 3/6, no rubs or gallops. No JVD. 2+ pulses bilaterally - dorsalis pedis and radial.  Lungs:  lung clear. No wheezes. No accessory muscle use or cyanosis. No tenderness to palpation.  Abdomen:  Normoactive bowel  "sounds. Soft, flat, non-tender, and non-distended. No hepatosplenomegaly; liver span approximately 10 cm.  Skin:  Warm, dry, well-perfused. No rashes or other lesions.  Extremities:  2+ pulses in upper and lower extremities. No lower extremity pain or edema; legs are symmetric in appearance.  Neuro:  Alert and oriented to person, place, and time. Able to communicate well. 5/5 strength in all extremities bilaterally. Sensation intact in all extremities. Normal gait.        Last Labs:  eGFR Cre: 55 at 7/30/2025  9:57 AM  Calculated from:  Serum Creatinine: 1.24 mg/dL at 7/30/2025  9:57 AM  Age: 89 years  Sex: Male at 7/30/2025  9:57 AM  Calculated using the CKD-EPI Creatinine Equation (2021)    CBC - 7/30/2025:  9:57 AM  7.4 11.5 183    35.0      BMP  138  104  27                  ----------------<110     4.2  25  1.24     CMP - 7/30/2025:  9:57 AM  9.0 6.0 21 --- 0.7   _ 4.2 25 47      PTT - No results in last year.  1.0   10.6 _     No results found for: \"TROPHS\", \"BNP\"     I personally review CBC and [facility]      KCCQ Questionnaire        1  Heart failure affects different people in different ways. Some feel shortness of breath while others feel fatigue. Please indicate how much you are limited by heart failure (shortness of breath or fatigue) in your ability to do the following activities over the past 2 weeks. PRE PROCEDURE     A.) Showering/bathing  3. Moderately  B.) Walking 1 block on level ground 1. Extremely  C.) Hurrying or Jogging   1. Extremely     2.  Over the past 2 weeks, how many times did you have swelling in your feet, ankles or legs when you woke up in the morning? 2. 3 or more times per week but not every day     3.  Over the past 2 weeks, on average, how many times has fatigue limited your ability to do what you wanted? 4. 3 or more times a week but not every day     4.  Over the past 2 weeks, on average, how many times has shortness of breath limited your ability to do what you wanted? 4. 3 or " more times a week but not every day     5.  Over the past 2 weeks, on average, how many times have you been forced to sleep sitting up in a chair or with at least 3 pillows to prop you up because of shortness of breath? Never     6. Over the past 2 weeks, how much has your heart failure limited your enjoyment of life? It has slightly limited my enjoyment of life     7. If you had to spend the rest of your life with your heart failure the way it is right now, how would you feel about this? 3. Somewhat satisfied     8. How much does your heart failure affect your lifestyle? Please indicate how your heart failure may have limited yourparticipation in the following activities over the past 2 weeks     A.)  Hobbies, recreational activities  3. Moderately limited     B.) Working or doing household chores  3. Moderately limited     C.) Visiting family or friends out of your home  3. Moderately limited     5 Meter Walk____16.5______seconds     Impression:   This is a 89yo M who lives an active lifestyle with PMH HTN, PAD, CKD who was referred for evaluation of his severe aortic stenosis. He has been doing well up until recently when he started noticing limitations in his activity, although he continues to lead an active lifestyle.     Plan:   We will order a heart cath  We will get CT TAVR protocol     The overall decision regarding the best treatment for this condition is complex.  We discussed options of both surgical aortic valve replacement and transcatheter aortic valve replacement along with risks and benefits involved with both of them in detail. We did a multi specialty discussion of this patient in clinic with the cardiac surgeon, nurses and fellows.    We discussed all the risks associated with the procedure, including but not limited to stroke, MI, pericardial tamponade, vascular complications, infection and death were discussed with the patient. The risk of needing a permament pacemaker was also discussed in  detail. The patient verbalized understanding and decided to proceed with the procedure.     We will discuss this patient's case at our Valve Team meeting with representatives from Structural Heart and Cardiac Surgery. Our nurse navigators will contact patient with further diagnostic needs and formal plan.   Patient seen and examined; I agree with documentation including A&P as above. I personally obtained the key and critical portions of the history and physical exam or was physically present for key and critical portions performed by the resident/fellow. I reviewed the resident/fellow's documentation and discussed the patient with the resident/fellow. I agree with the resident/fellow's medical decision making as documented in the note.           [1]   Family History  Problem Relation Name Age of Onset    No Known Problems Mother      No Known Problems Father     [2]   Allergies  Allergen Reactions    Penicillins Rash

## 2025-08-04 NOTE — PROGRESS NOTES
PCP: None  Cards: Jarrod    PMH:   Specialty Problems          Cardiology Problems    Aortic stenosis        Asymptomatic bilateral carotid artery stenosis        Bradycardia        Hypertension, essential        Peripheral vascular disease        Angina pectoris, unspecified        Chest pain        Nonrheumatic aortic (valve) stenosis            HPI    89 y.o. y/o male with PMH of presents for evaluation of severe, symptomatic aortic stenosis.  Patient relates a several month history of worsening fatigue, SOB, MCCLELLAN.  Denies overt orthopnea, PND, exertional CP.  Has occasional dizziness.  Denies syncope or presyncope. He is able to walk approx 1 mile daily but has been taking more frequent breaks than previously. Works with a  2 days per week. Denies increased abdominal girth, edema.  Gradually doing less and less activity secondary to symptoms.    Full 14 point ROS complete and negative except as noted above.     Structural Workup:     - NYHA: I  - Echo: I personally reviewed the ECHO with the following parameters  EF   Date/Time Value Ref Range Status   07/25/2025 12:09 PM 63 %    , mod-severe aortic stenosis, AV mean gradient 40, AV Vmax 4.23, BJ 0.97   - EKG: I personally review the EKG   Encounter Date: 07/24/25   ECG 12 lead (Clinic Performed)   Result Value    Ventricular Rate 64    Atrial Rate 64    [place] Interval 148    QRS Duration 100    QT Interval 410    QTC Calculation(Bazett) 422    P Axis 53    R Axis 36    T Axis 57    QRS Count 10    Q Onset 221    P Onset 147    P Offset 199    T Offset 426    QTC Fredericia 418    Narrative    Sinus rhythm with Premature atrial complexes  Otherwise normal ECG  When compared with ECG of 20-JAN-2025 10:13,  Premature atrial complexes are now Present  Confirmed by Yandel Nunez ([id number]) on 7/31/2025 7:59:38 AM      - [facility], pending  - CT TAVR: pending  - Dental clearance:  regular dentist visits q6 months, no issues.   - EFT 1/5  - STS for AVR    Procedure  Type: Isolated AVR  Perioperative Outcome Estimate %  Operative Mortality 4.09%  Morbidity & Mortality 9.57%  Stroke 1.82%  Renal Failure 2.17%  Reoperation 3.58%  Prolonged Ventilation 4.35%  Deep Sternal Wound Infection 0.028%  Long Hospital Stay (>14 days) 6.26%  Short Hospital Stay (<6 days)* 31.3%      Social History     Tobacco Use    Smoking status: Former     Types: Cigarettes    Smokeless tobacco: Never   Substance Use Topics    Alcohol use: Yes     Alcohol/week: 7.0 standard drinks of alcohol     Types: 7 Glasses of wine per week        [Family History]     [Family History]  Problem Relation Name Age of Onset    No Known Problems Mother      No Known Problems Father         [RX Allergies]     [RX Allergies]  Allergies  Allergen Reactions    Penicillins Rash       Current Outpatient Medications   Medication Instructions    aspirin 81 mg EC tablet 1 tablet, Daily    docusate sodium (COLACE) 100 mg, Daily with breakfast    enalapril (VASOTEC) 5 mg, oral, Daily, as directed    hydroxychloroquine (Plaquenil) 200 mg tablet TAKE 1 TABLET (200MG) ON MON,WED AND FRI AND 2 TABLETS (400MG) ON OTHER 4 DAYS    ketoconazole (NIZOral) 2 % shampoo PLEASE SEE ATTACHED FOR DETAILED DIRECTIONS    methotrexate (Trexall) 2.5 mg tablet 4 tablets, Once Weekly    multivitamin tablet 1 tablet, Daily    predniSONE (DELTASONE) 5 mg, Every morning    rosuvastatin (CRESTOR) 5 mg, oral, 2 times weekly    tamsulosin (FLOMAX) 0.4 mg, Nightly        Vitals:    08/04/25 0933   BP: 169/83   Pulse:    SpO2:         Physical Exam:     General:  Alert, calm, well-developed   HEENT:  Pupils equal, round, reactive to light and accommodation. Extraocular movements   Neck:  Supple, without lymphadenopathy or thyromegaly. No carotid bruits.  Lymph:  No axillary, cervical, supraclavicular, pre-auricular, submental, or occipital lymphadenopathy,  Cardiovascular:  Regular rate and rhythm, with normal S1 and S2. Aortic murmurs 3/6, no rubs or gallops.  "No JVD. 2+ pulses bilaterally - dorsalis pedis and radial.  Lungs:  lung clear. No wheezes. No accessory muscle use or cyanosis. No tenderness to palpation.  Abdomen:  Normoactive bowel sounds. Soft, flat, non-tender, and non-distended. No hepatosplenomegaly; liver span approximately 10 cm.  Skin:  Warm, dry, well-perfused. No rashes or other lesions.  Extremities:  2+ pulses in upper and lower extremities. No lower extremity pain or edema; legs are symmetric in appearance.  Neuro:  Alert and oriented to person, place, and time. Able to communicate well. 5/5 strength in all extremities bilaterally. Sensation intact in all extremities. Normal gait.        Last Labs:  eGFR Cre: 55 at 7/30/2025  9:57 AM  Calculated from:  Serum Creatinine: 1.24 mg/dL at 7/30/2025  9:57 AM  Age: 89 years  Sex: Male at 7/30/2025  9:57 AM  Calculated using the CKD-EPI Creatinine Equation (2021)    CBC - 7/30/2025:  9:57 AM  7.4 11.5 183    35.0      [facility]  138  104  27                  ----------------<110     4.2  25  1.24     CMP - 7/30/2025:  9:57 AM  9.0 6.0 21 --- 0.7   _ 4.2 25 47      PTT - No results in last year.  1.0   10.6 _     No results found for: \"TROPHS\", \"BNP\"     I personally review CBC and BMP      KCCQ Questionnaire        1  Heart failure affects different people in different ways. Some feel shortness of breath while others feel fatigue. Please indicate how much you are limited by heart failure (shortness of breath or fatigue) in your ability to do the following activities over the past 2 weeks. PRE PROCEDURE     A.) Showering/bathing  3. Moderately  B.) Walking 1 block on level ground 1. Extremely  C.) Hurrying or Jogging   1. Extremely     2.  Over the past 2 weeks, how many times did you have swelling in your feet, ankles or legs when you woke up in the morning? 2. 3 or more times per week but not every day     3.  Over the past 2 weeks, on average, how many times has fatigue limited your ability to do what you " wanted? 4. 3 or more times a week but not every day     4.  Over the past 2 weeks, on average, how many times has shortness of breath limited your ability to do what you wanted? 4. 3 or more times a week but not every day     5.  Over the past 2 weeks, on average, how many times have you been forced to sleep sitting up in a chair or with at least 3 pillows to prop you up because of shortness of breath? Never     6. Over the past 2 weeks, how much has your heart failure limited your enjoyment of life? It has slightly limited my enjoyment of life     7. If you had to spend the rest of your life with your heart failure the way it is right now, how would you feel about this? 3. Somewhat satisfied     8. How much does your heart failure affect your lifestyle? Please indicate how your heart failure may have limited yourparticipation in the following activities over the past 2 weeks     A.)  Hobbies, recreational activities  3. Moderately limited     B.) Working or doing household chores  3. Moderately limited     C.) Visiting family or friends out of your home  3. Moderately limited     5 Meter Walk____16.5______seconds     Impression:   This is a 89yo M who lives an active lifestyle with PMH HTN, PAD, CKD who was referred for evaluation of his severe aortic stenosis. He has been doing well up until recently when he started noticing limitations in his activity, although he continues to lead an active lifestyle.     Plan:   We will order a heart cath  We will get CT TAVR protocol     The overall decision regarding the best treatment for this condition is complex.  We discussed options of both surgical aortic valve replacement and transcatheter aortic valve replacement along with risks and benefits involved with both of them in detail. We did a multi specialty discussion of this patient in clinic with the cardiac surgeon, nurses and fellows.    We discussed all the risks associated with the procedure, including but not  limited to stroke, MI, pericardial tamponade, vascular complications, infection and death were discussed with the patient. The risk of needing a permament pacemaker was also discussed in detail. The patient verbalized understanding and decided to proceed with the procedure.     We will discuss this patient's case at our Valve Team meeting with representatives from Structural Heart and Cardiac Surgery. Our nurse navigators will contact patient with further diagnostic needs and formal plan.

## 2025-08-05 ENCOUNTER — TELEPHONE (OUTPATIENT)
Dept: CARDIOLOGY | Facility: HOSPITAL | Age: OVER 89
End: 2025-08-05
Payer: MEDICARE

## 2025-08-05 DIAGNOSIS — I35.0 NONRHEUMATIC AORTIC (VALVE) STENOSIS: Primary | ICD-10-CM

## 2025-08-06 ENCOUNTER — TELEPHONE (OUTPATIENT)
Dept: CARDIOLOGY | Facility: HOSPITAL | Age: OVER 89
End: 2025-08-06
Payer: MEDICARE

## 2025-08-06 NOTE — TELEPHONE ENCOUNTER
Received message inquiring on time change for 8-11-25 procedure. Left message need to arrive at 11:30am for 1:30pm procedure on 8-11-25.

## 2025-08-07 ENCOUNTER — TELEPHONE (OUTPATIENT)
Dept: CARDIOLOGY | Facility: HOSPITAL | Age: OVER 89
End: 2025-08-07
Payer: MEDICARE

## 2025-08-07 NOTE — TELEPHONE ENCOUNTER
Called & spoke with patient , Jackie. Confirmed fax [phone] & she provided another fax [phone]. Ct TAVR report from 8-4-25 faxed to both contact numbers. Jackie will send message to PCP for follow up.

## 2025-08-11 ENCOUNTER — TELEPHONE (OUTPATIENT)
Dept: CARDIOLOGY | Facility: HOSPITAL | Age: OVER 89
End: 2025-08-11
Payer: MEDICARE

## 2025-08-11 ENCOUNTER — PHARMACY VISIT (OUTPATIENT)
Dept: PHARMACY | Facility: CLINIC | Age: OVER 89
End: 2025-08-11
Payer: COMMERCIAL

## 2025-08-11 ENCOUNTER — HOSPITAL ENCOUNTER (OUTPATIENT)
Facility: HOSPITAL | Age: OVER 89
Setting detail: OUTPATIENT SURGERY
Discharge: HOME | End: 2025-08-11
Payer: MEDICARE

## 2025-08-11 VITALS
SYSTOLIC BLOOD PRESSURE: 172 MMHG | RESPIRATION RATE: 10 BRPM | HEIGHT: 69 IN | BODY MASS INDEX: 23.77 KG/M2 | OXYGEN SATURATION: 100 % | DIASTOLIC BLOOD PRESSURE: 79 MMHG | HEART RATE: 62 BPM | WEIGHT: 160.5 LBS

## 2025-08-11 DIAGNOSIS — Z95.5 S/P DRUG ELUTING CORONARY STENT PLACEMENT: ICD-10-CM

## 2025-08-11 DIAGNOSIS — I35.0 NONRHEUMATIC AORTIC (VALVE) STENOSIS: Primary | ICD-10-CM

## 2025-08-11 LAB
ACT BLD: 236 SEC (ref 83–199)
ACT BLD: 322 SEC (ref 83–199)

## 2025-08-11 PROCEDURE — 2780000003 HC OR 278 NO HCPCS

## 2025-08-11 PROCEDURE — 92972 PERQ TRLUML CORONRY LITHOTRP: CPT

## 2025-08-11 PROCEDURE — C1753 CATH, INTRAVAS ULTRASOUND: HCPCS

## 2025-08-11 PROCEDURE — C1887 CATHETER, GUIDING: HCPCS

## 2025-08-11 PROCEDURE — 92928 PRQ TCAT PLMT NTRAC ST 1 LES: CPT

## 2025-08-11 PROCEDURE — C1894 INTRO/SHEATH, NON-LASER: HCPCS

## 2025-08-11 PROCEDURE — 7100000010 HC PHASE TWO TIME - EACH INCREMENTAL 1 MINUTE

## 2025-08-11 PROCEDURE — C1725 CATH, TRANSLUMIN NON-LASER: HCPCS

## 2025-08-11 PROCEDURE — 99152 MOD SED SAME PHYS/QHP 5/>YRS: CPT

## 2025-08-11 PROCEDURE — 85347 COAGULATION TIME ACTIVATED: CPT

## 2025-08-11 PROCEDURE — 99153 MOD SED SAME PHYS/QHP EA: CPT

## 2025-08-11 PROCEDURE — 92978 ENDOLUMINL IVUS OCT C 1ST: CPT | Mod: RC

## 2025-08-11 PROCEDURE — 7100000009 HC PHASE TWO TIME - INITIAL BASE CHARGE

## 2025-08-11 PROCEDURE — C1760 CLOSURE DEV, VASC: HCPCS

## 2025-08-11 PROCEDURE — C9600 PERC DRUG-EL COR STENT SING: HCPCS | Mod: RC

## 2025-08-11 PROCEDURE — 92978 ENDOLUMINL IVUS OCT C 1ST: CPT

## 2025-08-11 PROCEDURE — RXMED WILLOW AMBULATORY MEDICATION CHARGE

## 2025-08-11 PROCEDURE — 93454 CORONARY ARTERY ANGIO S&I: CPT

## 2025-08-11 PROCEDURE — 2720000007 HC OR 272 NO HCPCS

## 2025-08-11 PROCEDURE — 2500000001 HC RX 250 WO HCPCS SELF ADMINISTERED DRUGS (ALT 637 FOR MEDICARE OP)

## 2025-08-11 PROCEDURE — C1769 GUIDE WIRE: HCPCS

## 2025-08-11 PROCEDURE — 2550000001 HC RX 255 CONTRASTS

## 2025-08-11 PROCEDURE — 2500000004 HC RX 250 GENERAL PHARMACY W/ HCPCS (ALT 636 FOR OP/ED)

## 2025-08-11 PROCEDURE — 93454 CORONARY ARTERY ANGIO S&I: CPT | Mod: 59

## 2025-08-11 PROCEDURE — C1874 STENT, COATED/COV W/DEL SYS: HCPCS

## 2025-08-11 DEVICE — IMPLANTABLE DEVICE: Type: IMPLANTABLE DEVICE | Site: HEART | Status: FUNCTIONAL

## 2025-08-11 RX ORDER — SODIUM CHLORIDE 9 MG/ML
75 INJECTION, SOLUTION INTRAVENOUS CONTINUOUS
Status: SHIPPED | OUTPATIENT
Start: 2025-08-11 | End: 2025-08-11

## 2025-08-11 RX ORDER — VERAPAMIL HYDROCHLORIDE 2.5 MG/ML
INJECTION INTRAVENOUS AS NEEDED
Status: DISCONTINUED | OUTPATIENT
Start: 2025-08-11 | End: 2025-08-11 | Stop reason: HOSPADM

## 2025-08-11 RX ORDER — CLOPIDOGREL BISULFATE 300 MG/1
TABLET, FILM COATED ORAL AS NEEDED
Status: DISCONTINUED | OUTPATIENT
Start: 2025-08-11 | End: 2025-08-11 | Stop reason: HOSPADM

## 2025-08-11 RX ORDER — LIDOCAINE HYDROCHLORIDE 20 MG/ML
INJECTION, SOLUTION INFILTRATION; PERINEURAL AS NEEDED
Status: DISCONTINUED | OUTPATIENT
Start: 2025-08-11 | End: 2025-08-11 | Stop reason: HOSPADM

## 2025-08-11 RX ORDER — MIDAZOLAM HYDROCHLORIDE 1 MG/ML
INJECTION, SOLUTION INTRAMUSCULAR; INTRAVENOUS AS NEEDED
Status: DISCONTINUED | OUTPATIENT
Start: 2025-08-11 | End: 2025-08-11 | Stop reason: HOSPADM

## 2025-08-11 RX ORDER — CLOPIDOGREL BISULFATE 75 MG/1
75 TABLET ORAL DAILY
Status: DISCONTINUED | OUTPATIENT
Start: 2025-08-12 | End: 2025-08-11 | Stop reason: HOSPADM

## 2025-08-11 RX ORDER — HEPARIN SODIUM 1000 [USP'U]/ML
INJECTION, SOLUTION INTRAVENOUS; SUBCUTANEOUS AS NEEDED
Status: DISCONTINUED | OUTPATIENT
Start: 2025-08-11 | End: 2025-08-11 | Stop reason: HOSPADM

## 2025-08-11 RX ORDER — CLOPIDOGREL BISULFATE 75 MG/1
75 TABLET ORAL DAILY
Qty: 90 TABLET | Refills: 3 | Status: SHIPPED | OUTPATIENT
Start: 2025-08-12 | End: 2025-08-12

## 2025-08-11 RX ORDER — FENTANYL CITRATE 50 UG/ML
INJECTION, SOLUTION INTRAMUSCULAR; INTRAVENOUS AS NEEDED
Status: DISCONTINUED | OUTPATIENT
Start: 2025-08-11 | End: 2025-08-11 | Stop reason: HOSPADM

## 2025-08-11 RX ORDER — ACETAMINOPHEN 325 MG/1
650 TABLET ORAL EVERY 6 HOURS PRN
Status: DISCONTINUED | OUTPATIENT
Start: 2025-08-11 | End: 2025-08-11 | Stop reason: HOSPADM

## 2025-08-12 RX ORDER — CLOPIDOGREL BISULFATE 75 MG/1
75 TABLET ORAL DAILY
Qty: 90 TABLET | Refills: 3 | Status: SHIPPED | OUTPATIENT
Start: 2025-08-12 | End: 2026-08-12

## 2025-08-13 ENCOUNTER — TELEPHONE (OUTPATIENT)
Dept: CARDIAC REHAB | Facility: CLINIC | Age: OVER 89
End: 2025-08-13
Payer: MEDICARE

## 2025-08-16 DIAGNOSIS — I10 HYPERTENSION, ESSENTIAL: Primary | ICD-10-CM

## 2025-08-16 DIAGNOSIS — I65.23 ASYMPTOMATIC BILATERAL CAROTID ARTERY STENOSIS: ICD-10-CM

## 2025-08-18 ENCOUNTER — CARDIOLOGY CONFERENCE (OUTPATIENT)
Dept: CARDIOLOGY | Facility: HOSPITAL | Age: OVER 89
End: 2025-08-18
Payer: MEDICARE

## 2025-08-18 DIAGNOSIS — Z95.5 S/P DRUG ELUTING CORONARY STENT PLACEMENT: Primary | ICD-10-CM

## 2025-08-18 RX ORDER — ROSUVASTATIN CALCIUM 5 MG/1
5 TABLET, COATED ORAL 2 TIMES WEEKLY
Qty: 24 TABLET | Refills: 3 | Status: SHIPPED | OUTPATIENT
Start: 2025-08-18 | End: 2026-08-18

## 2025-08-18 RX ORDER — ENALAPRIL MALEATE 5 MG/1
5 TABLET ORAL DAILY
Qty: 90 TABLET | Refills: 3 | Status: SHIPPED | OUTPATIENT
Start: 2025-08-18 | End: 2026-08-18

## 2025-08-20 DIAGNOSIS — I35.0 NONRHEUMATIC AORTIC (VALVE) STENOSIS: Primary | ICD-10-CM

## 2025-08-20 DIAGNOSIS — Z00.6 RESEARCH EXAM: Primary | ICD-10-CM

## 2025-08-21 ENCOUNTER — APPOINTMENT (OUTPATIENT)
Dept: CARDIAC REHAB | Facility: CLINIC | Age: OVER 89
End: 2025-08-21
Payer: MEDICARE

## 2025-08-25 ENCOUNTER — TELEPHONE (OUTPATIENT)
Dept: CARDIOLOGY | Facility: HOSPITAL | Age: OVER 89
End: 2025-08-25
Payer: MEDICARE

## 2025-08-28 PROBLEM — Z95.2 S/P TAVR (TRANSCATHETER AORTIC VALVE REPLACEMENT): Status: ACTIVE | Noted: 2025-08-28

## 2025-08-29 ENCOUNTER — HOSPITAL ENCOUNTER (OUTPATIENT)
Dept: RADIOLOGY | Facility: HOSPITAL | Age: OVER 89
Setting detail: SURGERY ADMIT
Discharge: HOME | End: 2025-08-29
Payer: MEDICARE

## 2025-08-29 ENCOUNTER — HOSPITAL ENCOUNTER (INPATIENT)
Facility: HOSPITAL | Age: OVER 89
End: 2025-08-29
Attending: INTERNAL MEDICINE | Admitting: INTERNAL MEDICINE
Payer: MEDICARE

## 2025-08-29 ENCOUNTER — APPOINTMENT (OUTPATIENT)
Dept: CARDIOLOGY | Facility: HOSPITAL | Age: OVER 89
End: 2025-08-29
Payer: MEDICARE

## 2025-08-29 VITALS — BODY MASS INDEX: 23.51 KG/M2 | HEIGHT: 69 IN | WEIGHT: 158.73 LBS

## 2025-08-29 DIAGNOSIS — Z00.6 RESEARCH EXAM: ICD-10-CM

## 2025-08-29 PROBLEM — I63.9 ACUTE CVA (CEREBROVASCULAR ACCIDENT) (MULTI): Status: ACTIVE | Noted: 2025-08-29

## 2025-08-29 PROCEDURE — 93325 DOPPLER ECHO COLOR FLOW MAPG: CPT

## 2025-08-29 PROCEDURE — 75561 CARDIAC MRI FOR MORPH W/DYE: CPT

## 2025-08-29 PROCEDURE — A9575 INJ GADOTERATE MEGLUMI 0.1ML: HCPCS | Performed by: INTERNAL MEDICINE

## 2025-08-29 PROCEDURE — 2550000001 HC RX 255 CONTRASTS: Performed by: INTERNAL MEDICINE

## 2025-08-29 RX ORDER — GADOTERATE MEGLUMINE 376.9 MG/ML
29 INJECTION INTRAVENOUS
Status: COMPLETED | OUTPATIENT
Start: 2025-08-29 | End: 2025-08-29

## 2025-08-29 RX ADMIN — GADOTERATE MEGLUMINE 29 ML: 376.9 INJECTION INTRAVENOUS at 11:22

## 2025-08-29 ASSESSMENT — PAIN - FUNCTIONAL ASSESSMENT
PAIN_FUNCTIONAL_ASSESSMENT: 0-10
PAIN_FUNCTIONAL_ASSESSMENT: CPOT (CRITICAL CARE PAIN OBSERVATION TOOL)

## 2025-08-29 ASSESSMENT — PAIN SCALES - GENERAL: PAINLEVEL_OUTOF10: 0 - NO PAIN

## 2025-08-30 ENCOUNTER — APPOINTMENT (OUTPATIENT)
Dept: CARDIOLOGY | Facility: HOSPITAL | Age: OVER 89
End: 2025-08-30
Payer: MEDICARE

## 2025-08-30 PROCEDURE — 93308 TTE F-UP OR LMTD: CPT

## 2025-08-30 SDOH — SOCIAL STABILITY: SOCIAL INSECURITY: WERE YOU ABLE TO COMPLETE ALL THE BEHAVIORAL HEALTH SCREENINGS?: NO

## 2025-08-30 ASSESSMENT — COGNITIVE AND FUNCTIONAL STATUS - GENERAL
STANDING UP FROM CHAIR USING ARMS: TOTAL
MOVING FROM LYING ON BACK TO SITTING ON SIDE OF FLAT BED WITH BEDRAILS: TOTAL
PERSONAL GROOMING: TOTAL
EATING MEALS: TOTAL
DRESSING REGULAR UPPER BODY CLOTHING: TOTAL
DRESSING REGULAR LOWER BODY CLOTHING: TOTAL
DAILY ACTIVITIY SCORE: 6
HELP NEEDED FOR BATHING: TOTAL
MOBILITY SCORE: 6
TOILETING: TOTAL
MOVING TO AND FROM BED TO CHAIR: TOTAL
CLIMB 3 TO 5 STEPS WITH RAILING: TOTAL
WALKING IN HOSPITAL ROOM: TOTAL
TURNING FROM BACK TO SIDE WHILE IN FLAT BAD: TOTAL

## 2025-08-30 ASSESSMENT — PAIN - FUNCTIONAL ASSESSMENT
PAIN_FUNCTIONAL_ASSESSMENT: CPOT (CRITICAL CARE PAIN OBSERVATION TOOL)

## 2025-08-31 ENCOUNTER — APPOINTMENT (OUTPATIENT)
Dept: CARDIOLOGY | Facility: HOSPITAL | Age: OVER 89
End: 2025-08-31
Payer: MEDICARE

## 2025-08-31 PROCEDURE — 93005 ELECTROCARDIOGRAM TRACING: CPT

## 2025-08-31 ASSESSMENT — PAIN - FUNCTIONAL ASSESSMENT
PAIN_FUNCTIONAL_ASSESSMENT: CPOT (CRITICAL CARE PAIN OBSERVATION TOOL)

## 2025-09-01 SDOH — ECONOMIC STABILITY: FOOD INSECURITY: HOW HARD IS IT FOR YOU TO PAY FOR THE VERY BASICS LIKE FOOD, HOUSING, MEDICAL CARE, AND HEATING?: NOT HARD AT ALL

## 2025-09-01 SDOH — ECONOMIC STABILITY: HOUSING INSECURITY: AT ANY TIME IN THE PAST 12 MONTHS, WERE YOU HOMELESS OR LIVING IN A SHELTER (INCLUDING NOW)?: NO

## 2025-09-01 SDOH — ECONOMIC STABILITY: FOOD INSECURITY: WITHIN THE PAST 12 MONTHS, THE FOOD YOU BOUGHT JUST DIDN'T LAST AND YOU DIDN'T HAVE MONEY TO GET MORE.: NEVER TRUE

## 2025-09-01 SDOH — SOCIAL STABILITY: SOCIAL INSECURITY
WITHIN THE LAST YEAR, HAVE YOU BEEN KICKED, HIT, SLAPPED, OR OTHERWISE PHYSICALLY HURT BY YOUR PARTNER OR EX-PARTNER?: PATIENT UNABLE TO ANSWER

## 2025-09-01 SDOH — HEALTH STABILITY: PHYSICAL HEALTH
HOW OFTEN DO YOU NEED TO HAVE SOMEONE HELP YOU WHEN YOU READ INSTRUCTIONS, PAMPHLETS, OR OTHER WRITTEN MATERIAL FROM YOUR DOCTOR OR PHARMACY?: NEVER

## 2025-09-01 SDOH — SOCIAL STABILITY: SOCIAL INSECURITY: ARE YOU MARRIED, WIDOWED, DIVORCED, SEPARATED, NEVER MARRIED, OR LIVING WITH A PARTNER?: MARRIED

## 2025-09-01 SDOH — SOCIAL STABILITY: SOCIAL NETWORK: IN A TYPICAL WEEK, HOW MANY TIMES DO YOU TALK ON THE PHONE WITH FAMILY, FRIENDS, OR NEIGHBORS?: PATIENT UNABLE TO ANSWER

## 2025-09-01 SDOH — HEALTH STABILITY: MENTAL HEALTH: HOW MANY DRINKS CONTAINING ALCOHOL DO YOU HAVE ON A TYPICAL DAY WHEN YOU ARE DRINKING?: 1 OR 2

## 2025-09-01 SDOH — ECONOMIC STABILITY: INCOME INSECURITY: IN THE PAST 12 MONTHS HAS THE ELECTRIC, GAS, OIL, OR WATER COMPANY THREATENED TO SHUT OFF SERVICES IN YOUR HOME?: NO

## 2025-09-01 SDOH — ECONOMIC STABILITY: HOUSING INSECURITY: IN THE LAST 12 MONTHS, WAS THERE A TIME WHEN YOU WERE NOT ABLE TO PAY THE MORTGAGE OR RENT ON TIME?: NO

## 2025-09-01 SDOH — HEALTH STABILITY: MENTAL HEALTH
DO YOU FEEL STRESS - TENSE, RESTLESS, NERVOUS, OR ANXIOUS, OR UNABLE TO SLEEP AT NIGHT BECAUSE YOUR MIND IS TROUBLED ALL THE TIME - THESE DAYS?: PATIENT UNABLE TO ANSWER

## 2025-09-01 SDOH — SOCIAL STABILITY: SOCIAL NETWORK: HOW OFTEN DO YOU ATTEND MEETINGS OF THE CLUBS OR ORGANIZATIONS YOU BELONG TO?: PATIENT UNABLE TO ANSWER

## 2025-09-01 SDOH — HEALTH STABILITY: PHYSICAL HEALTH: ON AVERAGE, HOW MANY MINUTES DO YOU ENGAGE IN EXERCISE AT THIS LEVEL?: 10 MIN

## 2025-09-01 SDOH — ECONOMIC STABILITY: HOUSING INSECURITY: IN THE PAST 12 MONTHS, HOW MANY TIMES HAVE YOU MOVED WHERE YOU WERE LIVING?: 0

## 2025-09-01 SDOH — SOCIAL STABILITY: SOCIAL INSECURITY
WITHIN THE LAST YEAR, HAVE YOU BEEN HUMILIATED OR EMOTIONALLY ABUSED IN OTHER WAYS BY YOUR PARTNER OR EX-PARTNER?: PATIENT UNABLE TO ANSWER

## 2025-09-01 SDOH — SOCIAL STABILITY: SOCIAL INSECURITY
WITHIN THE LAST YEAR, HAVE YOU BEEN RAPED OR FORCED TO HAVE ANY KIND OF SEXUAL ACTIVITY BY YOUR PARTNER OR EX-PARTNER?: PATIENT UNABLE TO ANSWER

## 2025-09-01 SDOH — SOCIAL STABILITY: SOCIAL INSECURITY
WITHIN THE LAST YEAR, HAVE YOU BEEN KICKED, HIT, SLAPPED, OR OTHERWISE PHYSICALLY HURT BY YOUR PARTNER OR EX-PARTNER?: NO

## 2025-09-01 SDOH — SOCIAL STABILITY: SOCIAL INSECURITY
WITHIN THE LAST YEAR, HAVE YOU BEEN RAPED OR FORCED TO HAVE ANY KIND OF SEXUAL ACTIVITY BY YOUR PARTNER OR EX-PARTNER?: NO

## 2025-09-01 SDOH — HEALTH STABILITY: PHYSICAL HEALTH: ON AVERAGE, HOW MANY DAYS PER WEEK DO YOU ENGAGE IN MODERATE TO STRENUOUS EXERCISE (LIKE A BRISK WALK)?: 3 DAYS

## 2025-09-01 SDOH — SOCIAL STABILITY: SOCIAL INSECURITY: WITHIN THE LAST YEAR, HAVE YOU BEEN AFRAID OF YOUR PARTNER OR EX-PARTNER?: NO

## 2025-09-01 SDOH — SOCIAL STABILITY: SOCIAL INSECURITY: WITHIN THE LAST YEAR, HAVE YOU BEEN HUMILIATED OR EMOTIONALLY ABUSED IN OTHER WAYS BY YOUR PARTNER OR EX-PARTNER?: NO

## 2025-09-01 SDOH — ECONOMIC STABILITY: FOOD INSECURITY: WITHIN THE PAST 12 MONTHS, YOU WORRIED THAT YOUR FOOD WOULD RUN OUT BEFORE YOU GOT THE MONEY TO BUY MORE.: NEVER TRUE

## 2025-09-01 SDOH — SOCIAL STABILITY: SOCIAL NETWORK: HOW OFTEN DO YOU GET TOGETHER WITH FRIENDS OR RELATIVES?: PATIENT UNABLE TO ANSWER

## 2025-09-01 SDOH — SOCIAL STABILITY: SOCIAL NETWORK
DO YOU BELONG TO ANY CLUBS OR ORGANIZATIONS SUCH AS CHURCH GROUPS, UNIONS, FRATERNAL OR ATHLETIC GROUPS, OR SCHOOL GROUPS?: PATIENT UNABLE TO ANSWER

## 2025-09-01 SDOH — HEALTH STABILITY: MENTAL HEALTH: HOW OFTEN DO YOU HAVE A DRINK CONTAINING ALCOHOL?: 2-4 TIMES A MONTH

## 2025-09-01 SDOH — HEALTH STABILITY: MENTAL HEALTH: HOW OFTEN DO YOU HAVE SIX OR MORE DRINKS ON ONE OCCASION?: NEVER

## 2025-09-01 SDOH — SOCIAL STABILITY: SOCIAL INSECURITY: WITHIN THE LAST YEAR, HAVE YOU BEEN AFRAID OF YOUR PARTNER OR EX-PARTNER?: PATIENT UNABLE TO ANSWER

## 2025-09-01 SDOH — SOCIAL STABILITY: SOCIAL NETWORK: HOW OFTEN DO YOU ATTEND CHURCH OR RELIGIOUS SERVICES?: PATIENT UNABLE TO ANSWER

## 2025-09-01 SDOH — ECONOMIC STABILITY: TRANSPORTATION INSECURITY: IN THE PAST 12 MONTHS, HAS LACK OF TRANSPORTATION KEPT YOU FROM MEDICAL APPOINTMENTS OR FROM GETTING MEDICATIONS?: NO

## 2025-09-01 ASSESSMENT — ACTIVITIES OF DAILY LIVING (ADL)
PATIENT'S MEMORY ADEQUATE TO SAFELY COMPLETE DAILY ACTIVITIES?: UNABLE TO ASSESS
HEARING - LEFT EAR: FUNCTIONAL
LACK_OF_TRANSPORTATION: NO
HEARING - RIGHT EAR: FUNCTIONAL
FEEDING YOURSELF: NEEDS ASSISTANCE
BATHING_ASSISTANCE: TOTAL
WALKS IN HOME: NEEDS ASSISTANCE
ADL_ASSISTANCE: INDEPENDENT
TOILETING: NEEDS ASSISTANCE
LACK_OF_TRANSPORTATION: NO
ADEQUATE_TO_COMPLETE_ADL: YES
DRESSING YOURSELF: NEEDS ASSISTANCE
BATHING: NEEDS ASSISTANCE
ADL_ASSISTANCE: INDEPENDENT
JUDGMENT_ADEQUATE_SAFELY_COMPLETE_DAILY_ACTIVITIES: UNABLE TO ASSESS
GROOMING: NEEDS ASSISTANCE
LACK_OF_TRANSPORTATION: NO

## 2025-09-01 ASSESSMENT — COGNITIVE AND FUNCTIONAL STATUS - GENERAL
EATING MEALS: A LOT
DRESSING REGULAR UPPER BODY CLOTHING: A LOT
PERSONAL GROOMING: A LOT
CLIMB 3 TO 5 STEPS WITH RAILING: TOTAL
MOVING FROM LYING ON BACK TO SITTING ON SIDE OF FLAT BED WITH BEDRAILS: A LOT
MOBILITY SCORE: 7
EATING MEALS: A LOT
TOILETING: TOTAL
DAILY ACTIVITIY SCORE: 12
TURNING FROM BACK TO SIDE WHILE IN FLAT BAD: A LOT
TOILETING: A LOT
MOBILITY SCORE: 10
DRESSING REGULAR LOWER BODY CLOTHING: TOTAL
WALKING IN HOSPITAL ROOM: TOTAL
STANDING UP FROM CHAIR USING ARMS: TOTAL
WALKING IN HOSPITAL ROOM: TOTAL
HELP NEEDED FOR BATHING: A LOT
MOVING TO AND FROM BED TO CHAIR: TOTAL
STANDING UP FROM CHAIR USING ARMS: A LOT
CLIMB 3 TO 5 STEPS WITH RAILING: TOTAL
DAILY ACTIVITIY SCORE: 9
DRESSING REGULAR UPPER BODY CLOTHING: A LOT
HELP NEEDED FOR BATHING: TOTAL
MOVING TO AND FROM BED TO CHAIR: A LOT
PATIENT BASELINE BEDBOUND: UNABLE TO ASSESS AT THIS TIME
DRESSING REGULAR LOWER BODY CLOTHING: A LOT
MOVING FROM LYING ON BACK TO SITTING ON SIDE OF FLAT BED WITH BEDRAILS: A LOT
TURNING FROM BACK TO SIDE WHILE IN FLAT BAD: TOTAL
PERSONAL GROOMING: A LOT

## 2025-09-01 ASSESSMENT — LIFESTYLE VARIABLES
SKIP TO QUESTIONS 9-10: 1
AUDIT-C TOTAL SCORE: 2

## 2025-09-01 ASSESSMENT — PAIN SCALES - GENERAL
PAINLEVEL_OUTOF10: 0 - NO PAIN

## 2025-09-01 ASSESSMENT — PAIN - FUNCTIONAL ASSESSMENT
PAIN_FUNCTIONAL_ASSESSMENT: CPOT (CRITICAL CARE PAIN OBSERVATION TOOL)
PAIN_FUNCTIONAL_ASSESSMENT: 0-10
PAIN_FUNCTIONAL_ASSESSMENT: CPOT (CRITICAL CARE PAIN OBSERVATION TOOL)

## 2025-09-02 ASSESSMENT — COGNITIVE AND FUNCTIONAL STATUS - GENERAL
DRESSING REGULAR LOWER BODY CLOTHING: TOTAL
TURNING FROM BACK TO SIDE WHILE IN FLAT BAD: TOTAL
MOVING TO AND FROM BED TO CHAIR: TOTAL
TURNING FROM BACK TO SIDE WHILE IN FLAT BAD: TOTAL
MOBILITY SCORE: 7
WALKING IN HOSPITAL ROOM: TOTAL
HELP NEEDED FOR BATHING: TOTAL
MOVING FROM LYING ON BACK TO SITTING ON SIDE OF FLAT BED WITH BEDRAILS: A LOT
MOVING TO AND FROM BED TO CHAIR: TOTAL
PERSONAL GROOMING: TOTAL
DRESSING REGULAR UPPER BODY CLOTHING: TOTAL
STANDING UP FROM CHAIR USING ARMS: TOTAL
DAILY ACTIVITIY SCORE: 7
EATING MEALS: A LOT
WALKING IN HOSPITAL ROOM: TOTAL
MOVING FROM LYING ON BACK TO SITTING ON SIDE OF FLAT BED WITH BEDRAILS: A LOT
MOBILITY SCORE: 7
CLIMB 3 TO 5 STEPS WITH RAILING: TOTAL
TOILETING: TOTAL
CLIMB 3 TO 5 STEPS WITH RAILING: TOTAL
STANDING UP FROM CHAIR USING ARMS: TOTAL

## 2025-09-02 ASSESSMENT — PAIN SCALES - GENERAL: PAINLEVEL_OUTOF10: 0 - NO PAIN

## 2025-09-02 ASSESSMENT — PAIN - FUNCTIONAL ASSESSMENT: PAIN_FUNCTIONAL_ASSESSMENT: UNABLE TO SELF-REPORT

## 2025-09-03 ENCOUNTER — APPOINTMENT (OUTPATIENT)
Dept: CARDIOLOGY | Facility: HOSPITAL | Age: OVER 89
End: 2025-09-03
Payer: MEDICARE

## 2025-09-03 PROCEDURE — 93325 DOPPLER ECHO COLOR FLOW MAPG: CPT

## 2025-09-03 PROCEDURE — 93321 DOPPLER ECHO F-UP/LMTD STD: CPT | Performed by: INTERNAL MEDICINE

## 2025-09-03 PROCEDURE — 93308 TTE F-UP OR LMTD: CPT | Performed by: INTERNAL MEDICINE

## 2025-09-03 PROCEDURE — 93325 DOPPLER ECHO COLOR FLOW MAPG: CPT | Performed by: INTERNAL MEDICINE

## 2025-09-03 ASSESSMENT — COGNITIVE AND FUNCTIONAL STATUS - GENERAL
DRESSING REGULAR UPPER BODY CLOTHING: A LOT
DRESSING REGULAR LOWER BODY CLOTHING: A LOT
MOVING TO AND FROM BED TO CHAIR: TOTAL
CLIMB 3 TO 5 STEPS WITH RAILING: TOTAL
TOILETING: A LOT
DAILY ACTIVITIY SCORE: 10
HELP NEEDED FOR BATHING: TOTAL
TURNING FROM BACK TO SIDE WHILE IN FLAT BAD: A LOT
PERSONAL GROOMING: A LOT
WALKING IN HOSPITAL ROOM: TOTAL
MOVING FROM LYING ON BACK TO SITTING ON SIDE OF FLAT BED WITH BEDRAILS: A LOT
EATING MEALS: TOTAL
STANDING UP FROM CHAIR USING ARMS: TOTAL
MOBILITY SCORE: 8

## 2025-09-03 ASSESSMENT — PAIN - FUNCTIONAL ASSESSMENT
PAIN_FUNCTIONAL_ASSESSMENT: CPOT (CRITICAL CARE PAIN OBSERVATION TOOL)
PAIN_FUNCTIONAL_ASSESSMENT: CPOT (CRITICAL CARE PAIN OBSERVATION TOOL)
PAIN_FUNCTIONAL_ASSESSMENT: 0-10
PAIN_FUNCTIONAL_ASSESSMENT: CPOT (CRITICAL CARE PAIN OBSERVATION TOOL)

## 2025-09-03 ASSESSMENT — PAIN SCALES - GENERAL
PAINLEVEL_OUTOF10: 0 - NO PAIN
PAINLEVEL_OUTOF10: 0 - NO PAIN

## 2025-09-04 ASSESSMENT — PAIN - FUNCTIONAL ASSESSMENT
PAIN_FUNCTIONAL_ASSESSMENT: CPOT (CRITICAL CARE PAIN OBSERVATION TOOL)

## 2025-09-05 ASSESSMENT — COGNITIVE AND FUNCTIONAL STATUS - GENERAL
MOBILITY SCORE: 7
DRESSING REGULAR UPPER BODY CLOTHING: A LOT
STANDING UP FROM CHAIR USING ARMS: TOTAL
MOVING TO AND FROM BED TO CHAIR: TOTAL
DRESSING REGULAR LOWER BODY CLOTHING: TOTAL
EATING MEALS: TOTAL
WALKING IN HOSPITAL ROOM: TOTAL
PERSONAL GROOMING: TOTAL
HELP NEEDED FOR BATHING: TOTAL
CLIMB 3 TO 5 STEPS WITH RAILING: TOTAL
DAILY ACTIVITIY SCORE: 7
TURNING FROM BACK TO SIDE WHILE IN FLAT BAD: TOTAL
TOILETING: TOTAL
MOVING FROM LYING ON BACK TO SITTING ON SIDE OF FLAT BED WITH BEDRAILS: A LOT

## 2025-09-05 ASSESSMENT — PAIN - FUNCTIONAL ASSESSMENT
PAIN_FUNCTIONAL_ASSESSMENT: CPOT (CRITICAL CARE PAIN OBSERVATION TOOL)
PAIN_FUNCTIONAL_ASSESSMENT: CPOT (CRITICAL CARE PAIN OBSERVATION TOOL)
PAIN_FUNCTIONAL_ASSESSMENT: 0-10
PAIN_FUNCTIONAL_ASSESSMENT: CPOT (CRITICAL CARE PAIN OBSERVATION TOOL)
PAIN_FUNCTIONAL_ASSESSMENT: CPOT (CRITICAL CARE PAIN OBSERVATION TOOL)
PAIN_FUNCTIONAL_ASSESSMENT: 0-10
PAIN_FUNCTIONAL_ASSESSMENT: CPOT (CRITICAL CARE PAIN OBSERVATION TOOL)
PAIN_FUNCTIONAL_ASSESSMENT: 0-10

## 2025-09-05 ASSESSMENT — PAIN SCALES - GENERAL
PAINLEVEL_OUTOF10: 0 - NO PAIN

## 2025-09-06 ASSESSMENT — PAIN - FUNCTIONAL ASSESSMENT
PAIN_FUNCTIONAL_ASSESSMENT: CPOT (CRITICAL CARE PAIN OBSERVATION TOOL)

## 2025-09-07 ASSESSMENT — PAIN SCALES - GENERAL
PAINLEVEL_OUTOF10: 0 - NO PAIN
PAINLEVEL_OUTOF10: 0 - NO PAIN

## 2025-09-07 ASSESSMENT — PAIN SCALES - WONG BAKER: WONGBAKER_NUMERICALRESPONSE: HURTS LITTLE BIT

## 2025-09-07 ASSESSMENT — PAIN - FUNCTIONAL ASSESSMENT
PAIN_FUNCTIONAL_ASSESSMENT: 0-10
PAIN_FUNCTIONAL_ASSESSMENT: WONG-BAKER FACES

## 2025-10-01 ENCOUNTER — APPOINTMENT (OUTPATIENT)
Dept: RADIOLOGY | Facility: HOSPITAL | Age: OVER 89
End: 2025-10-01
Payer: MEDICARE

## (undated) DEVICE — TR BAND, RADIAL COMPRESSION, STANDARD, 24CM

## (undated) DEVICE — CATHETER, BALLOON, NC EUPHORA NONCOMPLIANT 3.5 X 8 X 142CM

## (undated) DEVICE — Device

## (undated) DEVICE — CATHETER, ANGIO, IMPULSE, FR4, 6 FR X 100 CM

## (undated) DEVICE — CATHETER, ANGIO, IMPULSE, FL4, 6 FR X 100 CM

## (undated) DEVICE — INFLATION KIT, ADVANTAGE, ENCORE 26 (1/BOX)

## (undated) DEVICE — CATHETER, C2+ IVL, SHOCKWAVE I, 2.5 X 12MM

## (undated) DEVICE — CATHETER, BALLOON, NC EUPHORA NONCOMPLIANT 3.0 X 12 X 142CM

## (undated) DEVICE — GUIDEWIRE, RUN THROUGH WIRE, 180CM

## (undated) DEVICE — GUIDEWIRE, INQWIRE, 3MM J, .035 X 210CM, FIXED

## (undated) DEVICE — CATHETER, BALLOON, NC EUPHORA NONCOMPLIANT 2.0 X 12 X 142CM

## (undated) DEVICE — CATHETER, ANGIO, IMPULSE, FL3.5, 6 FR X 100 CM

## (undated) DEVICE — SHEATH, GLIDESHEATH, SLENDER, 6FR 10CM

## (undated) DEVICE — CATHETER, DRAGONFLY, OPSTAR

## (undated) DEVICE — PAD, ELECTRODE DEFIB PADPRO ADULT STRL W/ADAPTER

## (undated) DEVICE — CATHETER, GUIDING, LAUNCHER, 6FR, JR 4.0